# Patient Record
Sex: FEMALE | Race: ASIAN | NOT HISPANIC OR LATINO | URBAN - METROPOLITAN AREA
[De-identification: names, ages, dates, MRNs, and addresses within clinical notes are randomized per-mention and may not be internally consistent; named-entity substitution may affect disease eponyms.]

---

## 2017-09-06 ENCOUNTER — OUTPATIENT (OUTPATIENT)
Dept: OUTPATIENT SERVICES | Facility: HOSPITAL | Age: 62
LOS: 1 days | End: 2017-09-06
Payer: COMMERCIAL

## 2017-09-06 DIAGNOSIS — Z51.89 ENCOUNTER FOR OTHER SPECIFIED AFTERCARE: ICD-10-CM

## 2017-09-11 DIAGNOSIS — S32.028D OTHER FRACTURE OF SECOND LUMBAR VERTEBRA, SUBSEQUENT ENCOUNTER FOR FRACTURE WITH ROUTINE HEALING: ICD-10-CM

## 2017-10-27 PROCEDURE — 97010 HOT OR COLD PACKS THERAPY: CPT

## 2017-10-27 PROCEDURE — 97163 PT EVAL HIGH COMPLEX 45 MIN: CPT

## 2017-10-27 PROCEDURE — 97140 MANUAL THERAPY 1/> REGIONS: CPT

## 2017-10-27 PROCEDURE — 97110 THERAPEUTIC EXERCISES: CPT

## 2017-11-03 PROBLEM — Z00.00 ENCOUNTER FOR PREVENTIVE HEALTH EXAMINATION: Status: ACTIVE | Noted: 2017-11-03

## 2017-11-22 ENCOUNTER — APPOINTMENT (OUTPATIENT)
Dept: ORTHOPEDIC SURGERY | Facility: CLINIC | Age: 62
End: 2017-11-22

## 2020-04-26 ENCOUNTER — MESSAGE (OUTPATIENT)
Age: 65
End: 2020-04-26

## 2022-04-07 ENCOUNTER — LABORATORY RESULT (OUTPATIENT)
Age: 67
End: 2022-04-07

## 2023-02-09 ENCOUNTER — NON-APPOINTMENT (OUTPATIENT)
Age: 68
End: 2023-02-09

## 2023-03-08 ENCOUNTER — NON-APPOINTMENT (OUTPATIENT)
Age: 68
End: 2023-03-08

## 2023-03-08 ENCOUNTER — APPOINTMENT (OUTPATIENT)
Dept: ORTHOPEDIC SURGERY | Facility: CLINIC | Age: 68
End: 2023-03-08
Payer: COMMERCIAL

## 2023-03-08 VITALS
SYSTOLIC BLOOD PRESSURE: 138 MMHG | BODY MASS INDEX: 32.98 KG/M2 | HEART RATE: 76 BPM | WEIGHT: 168 LBS | DIASTOLIC BLOOD PRESSURE: 77 MMHG | HEIGHT: 60 IN

## 2023-03-08 PROCEDURE — 99204 OFFICE O/P NEW MOD 45 MIN: CPT

## 2023-03-08 PROCEDURE — 73562 X-RAY EXAM OF KNEE 3: CPT | Mod: RT

## 2023-03-08 NOTE — HISTORY OF PRESENT ILLNESS
[Pain Location] : pain [Worsening] : worsening [___ mths] : [unfilled] month(s) ago [3] : a current pain level of 3/10 [Walking] : worsened by walking [Knee Flexion] : worsened with knee flexion [Acetaminophen] : relieved by acetaminophen [Rest] : relieved by rest [de-identified] : 66 y/o F pt is here for right knee pain. Patient states the pain has been worsening in the past 8 months 1 day it did swell significantly she could not bear weight for 2 days now the pain is better but she is very limited with her walking if she walks more than 10 minutes she has to sit down she has been in physical therapy which helps her with the motion today her pain intensity is 3 out of 10 it is localized in the knee she feels achy pain she did try HA injection in December 2022 with Dr. Jordan which helped a little bit she had MRI of the right knee done on August 11, 2022 which showed severe medial and moderate patellofemoral lateral compartment osteoarthritis. 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 \par She uses Tylenol for pain

## 2023-03-08 NOTE — DISCUSSION/SUMMARY
[Medication Risks Reviewed] : Medication risks reviewed [Surgical risks reviewed] : Surgical risks reviewed [de-identified] : 66 y/o F pt is here for advanced medial compartmental osteoarthritis of the right knee. The nature of her condition and treatment options were discussed in detail. The pt is a reasonable future candidate for a right TKA. The surgery was discussed in detail. The pt has exhausted conservative treatment such as HA injections, cortisone injections, and antiinflammatories which are decreasingly helpful. She states she is losing her quality of life and is interested in surgery. She will talk with the surgical coordinators to schedule a right TKA. All questions were answered. \par \par The patient is a 67 year individual with end stage arthritis of their right knee joint. Based upon the patient's continued symptoms and failure to respond to conservative treatment (including HA injections, cortisone injections, over the counter medications, and PT)I have recommended a right total knee arthroplasty for this patient. A long discussion took place with the patient describing what a total joint replacement is and what the procedure would entail. A total knee arthroplasty model, similar to the implant that was used during the operation, was utilized to demonstrate and to discuss the various bearing surfaces of the implants. The hospitalization and post-operative care and rehabilitation were also discussed. The use of perioperative antibiotics and DVT prophylaxis were discussed. The risk, benefits and alternatives to a surgical intervention were discussed at length with the patient. The patient was also advised of risks related to the medical comorbidities, elevated body mass index (BMI), and smoking where applicable. We discussed how to reduce modifiable risk factors and encouraged smoking cessation were applicable. A lengthy discussion took place to review the most common complications including but not limited to: deep vein thrombosis, pulmonary embolus, heart attack, stroke, infection, wound breakdown, numbness, damage to nerves, tendon, muscles, arteries or other blood vessels, death and other possible complications from anesthesia. The patient was told that we will take steps to minimize these risks by using sterile technique, antibiotics and DVT prophylaxis when appropriate and follow the patient postoperatively in the office setting to monitor progress. The possibility of recurrent pain, no improvement in pain and actual worsening of pain were also discussed with the patient.\par The discharge plan of care focused on the patient going home following surgery. The patient was encouraged to make the necessary arrangements to have someone stay with them when they are discharged home. Following discharge, a home care nurse was to the patient. The home care nurse would open the patient’s home care case and request home physical therapy services. Home physical therapy was to commence following discharge provided it was appropriate and covered by the health insurance benefit plan. \par The benefits of surgery were discussed with the patient including the potential for improving her current clinical condition through operative intervention. Alternatives to surgical intervention including continued conservative management were also discussed in detail. All questions were answered to the satisfaction of the patient. The treatment plan of care, as well as a model of a total knee arthroplasty equivalent to the one that will be used for their total joint replacement, was shared with the patient. The patient agreed to the plan of care as well as the use of implants in their total joint replacement.

## 2023-03-08 NOTE — PHYSICAL EXAM
[Antalgic] : antalgic [LE] : Sensory: Intact in bilateral lower extremities [ALL] : dorsalis pedis, posterior tibial, femoral, popliteal, and radial 2+ and symmetric bilaterally [Normal] : Alert and in no acute distress [Poor Appearance] : well-appearing [de-identified] : GENERAL APPEARANCE: Well nourished and hydrated, pleasant, alert, and oriented x 3. Appears their stated age. \par HEENT: Normocephalic, extraocular eye motion intact. Nasal septum midline. Oral cavity clear. External auditory canal clear. \par RESPIRATORY: Breath sounds clear and audible in all lobes. No wheezing, No accessory muscle use.\par CARDIOVASCULAR: No apparent abnormalities. No lower leg edema. No varicosities. Pedal pulses are palpable.\par NEUROLOGIC: Sensation is normal, no muscle weakness in the upper or lower extremities.\par DERMATOLOGIC: No apparent skin lesions, moist, warm, no rash.\par SPINE: Cervical spine appears normal and moves freely; thoracic spine appears normal and moves freely; lumbosacral spine appears normal and moves freely, normal, nontender.\par MUSCULOSKELETAL: Hands, wrists, and elbows are normal and move freely, shoulders are normal and move freely.  [de-identified] : Right knee exam shows swelling and tenderness in the right knee range of motion is a 0 to 120 degrees with pain. 	  [de-identified] : 3V xray of the right knee done in the office today and reviewed by Dr. Kevin Mo demonstrates advanced medial compartmental osteoarthritis 	 \par \par MRI of the right knee done at [facility] on Aug 11, 2022 shows:\par 1. Severe medial, moderate patellofemoral and lateral compartmental osteoarthritis \par 2. Complex tear of the posterior horn of the medial meniscus with partial thickness radial and peripheral longitudinal tear. Intrasubstance degeneration and partial extrusion of the body.\par 3. Small partial thickness radial tear of the free edge of the anterior horn of the lateral meniscus. \par 4. Sprain (grade 1 injury) of the MCL\par 5. Partial tear of the ACL.\par 6. Large joint effusion, synovitis and intra-articular bodies.

## 2023-09-06 ENCOUNTER — APPOINTMENT (OUTPATIENT)
Dept: ORTHOPEDIC SURGERY | Facility: CLINIC | Age: 68
End: 2023-09-06
Payer: COMMERCIAL

## 2023-09-06 VITALS
BODY MASS INDEX: 32.98 KG/M2 | HEIGHT: 60 IN | HEART RATE: 76 BPM | WEIGHT: 168 LBS | SYSTOLIC BLOOD PRESSURE: 138 MMHG | DIASTOLIC BLOOD PRESSURE: 73 MMHG

## 2023-09-06 DIAGNOSIS — M17.11 UNILATERAL PRIMARY OSTEOARTHRITIS, RIGHT KNEE: ICD-10-CM

## 2023-09-06 PROCEDURE — 99214 OFFICE O/P EST MOD 30 MIN: CPT

## 2023-09-06 PROCEDURE — 73562 X-RAY EXAM OF KNEE 3: CPT | Mod: LT

## 2023-09-06 NOTE — DISCUSSION/SUMMARY
[Medication Risks Reviewed] : Medication risks reviewed [Surgical risks reviewed] : Surgical risks reviewed [de-identified] : 69 y/o F pt is here for advanced medial compartmental osteoarthritis of the right knee and left knee. The nature of her condition and treatment options were discussed in detail. The pt is a candidate for a bilateral TKA. The surgery was discussed in detail. The pt has exhausted conservative treatment such as HA injections, cortisone injections, and anti-inflammatories which are decreasingly helpful. The pt has been scheduled for a right TKA in October. She states her left knee pain is worsening and would like to schedule surgery for the left knee on the same day or a few weeks after the right knee. We discussed a possible simultaneous bilateral TKA. The pt decided to pursue her left TKA after her scheduled right TKA in October. She will talk with the surgical coordinators to schedule a left TKA in April 2024 after her right knee. All questions were answered.  The patient is a 68 year individual with end stage arthritis of their right knee joint. Based upon the patient's continued symptoms and failure to respond to conservative treatment (including HA injections, cortisone injections, over the counter medications, and PT)I have recommended a right total knee arthroplasty for this patient. A long discussion took place with the patient describing what a total joint replacement is and what the procedure would entail. A total knee arthroplasty model, similar to the implant that was used during the operation, was utilized to demonstrate and to discuss the various bearing surfaces of the implants. The hospitalization and post-operative care and rehabilitation were also discussed. The use of perioperative antibiotics and DVT prophylaxis were discussed. The risk, benefits and alternatives to a surgical intervention were discussed at length with the patient. The patient was also advised of risks related to the medical comorbidities, elevated body mass index (BMI), and smoking where applicable. We discussed how to reduce modifiable risk factors and encouraged smoking cessation were applicable. A lengthy discussion took place to review the most common complications including but not limited to: deep vein thrombosis, pulmonary embolus, heart attack, stroke, infection, wound breakdown, numbness, damage to nerves, tendon, muscles, arteries or other blood vessels, death and other possible complications from anesthesia. The patient was told that we will take steps to minimize these risks by using sterile technique, antibiotics and DVT prophylaxis when appropriate and follow the patient postoperatively in the office setting to monitor progress. The possibility of recurrent pain, no improvement in pain and actual worsening of pain were also discussed with the patient. The discharge plan of care focused on the patient going home following surgery. The patient was encouraged to make the necessary arrangements to have someone stay with them when they are discharged home. Following discharge, a home care nurse was to the patient. The home care nurse would open the patients home care case and request home physical therapy services. Home physical therapy was to commence following discharge provided it was appropriate and covered by the health insurance benefit plan.  The benefits of surgery were discussed with the patient including the potential for improving her current clinical condition through operative intervention. Alternatives to surgical intervention including continued conservative management were also discussed in detail. All questions were answered to the satisfaction of the patient. The treatment plan of care, as well as a model of a total knee arthroplasty equivalent to the one that will be used for their total joint replacement, was shared with the patient. The patient agreed to the plan of care as well as the use of implants in their total joint replacement.   "The patient is a 68 year individual with end stage arthritis of their left knee joint. Based upon the patient's continued symptoms and failure to respond to conservative treatment (including HA injections, cortisone injections, over the counter medications, and PT) I have recommended a left total knee arthroplasty for this patient. A long discussion took place with the patient describing what a total joint replacement is and what the procedure would entail. A total knee arthroplasty model, similar to the implant that was used during the operation, was utilized to demonstrate and to discuss the various bearing surfaces of the implants. The hospitalization and post-operative care and rehabilitation were also discussed. The use of perioperative antibiotics and DVT prophylaxis were discussed. The risk, benefits and alternatives to a surgical intervention were discussed at length with the patient. The patient was also advised of risks related to the medical comorbidities, elevated body mass index (BMI), and smoking where applicable. We discussed how to reduce modifiable risk factors and encouraged smoking cessation were applicable. A lengthy discussion took place to review the most common complications including but not limited to: deep vein thrombosis, pulmonary embolus, heart attack, stroke, infection, wound breakdown, numbness, damage to nerves, tendon, muscles, arteries or other blood vessels, death and other possible complications from anesthesia. The patient was told that we will take steps to minimize these risks by using sterile technique, antibiotics and DVT prophylaxis when appropriate and follow the patient postoperatively in the office setting to monitor progress. The possibility of recurrent pain, no improvement in pain and actual worsening of pain were also discussed with the patient. The discharge plan of care focused on the patient going home following surgery. The patient was encouraged to make the necessary arrangements to have someone stay with them when they are discharged home. Following discharge, a home care nurse was to the patient. The home care nurse would open the patients home care case and request home physical therapy services. Home physical therapy was to commence following discharge provided it was appropriate and covered by the health insurance benefit plan.  The benefits of surgery were discussed with the patient including the potential for improving her current clinical condition through operative intervention. Alternatives to surgical intervention including continued conservative management were also discussed in detail. All questions were answered to the satisfaction of the patient. The treatment plan of care, as well as a model of a total knee arthroplasty equivalent to the one that will be used for their total joint replacement, was shared with the patient. The patient agreed to the plan of care as well as the use of implants in their total joint replacement."

## 2023-09-06 NOTE — HISTORY OF PRESENT ILLNESS
[Pain Location] : pain [Stable] : stable [4] : a current pain level of 4/10 [de-identified] : Patient presents to office complaining of chronic left knee pain.  She had arthroscopy in the past she had Synvisc injection twice she also had cortisone injection in the left knee.  patient was seen for the right knee on March 8, 2023 she is scheduled to have right knee replacement in October.  Patient is interested in surgical treatment in the left knee as well. She is limited with distance walking and riding bicycle outside which she would like to achieve after the knee replacement.  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  She uses Tylenol for pain

## 2023-09-06 NOTE — PHYSICAL EXAM
[Antalgic] : antalgic [de-identified] : GENERAL APPEARANCE: Well nourished and hydrated, pleasant, alert, and oriented x 3. Appears their stated age. \par  HEENT: Normocephalic, extraocular eye motion intact. Nasal septum midline. Oral cavity clear. External auditory canal clear. \par  RESPIRATORY: Breath sounds clear and audible in all lobes. No wheezing, No accessory muscle use.\par  CARDIOVASCULAR: No apparent abnormalities. No lower leg edema. No varicosities. Pedal pulses are palpable.\par  NEUROLOGIC: Sensation is normal, no muscle weakness in the upper or lower extremities.\par  DERMATOLOGIC: No apparent skin lesions, moist, warm, no rash.\par  SPINE: Cervical spine appears normal and moves freely; thoracic spine appears normal and moves freely; lumbosacral spine appears normal and moves freely, normal, nontender.\par  MUSCULOSKELETAL: Hands, wrists, and elbows are normal and move freely, shoulders are normal and move freely.  [de-identified] : b/l knee exam shows	swelling and tenderness in the right knee range of motion is a 0 to 120 degrees with pain. 	  [de-identified] : 3V xray of the left knee done in the office today and reviewed by Dr. Kevin Mo demonstrates advanced medial compartmental osteoarthritis.

## 2023-09-25 ENCOUNTER — APPOINTMENT (OUTPATIENT)
Dept: ORTHOPEDIC SURGERY | Facility: CLINIC | Age: 68
End: 2023-09-25

## 2023-09-27 ENCOUNTER — OUTPATIENT (OUTPATIENT)
Dept: OUTPATIENT SERVICES | Facility: HOSPITAL | Age: 68
LOS: 1 days | End: 2023-09-27
Payer: COMMERCIAL

## 2023-09-27 VITALS
HEART RATE: 77 BPM | DIASTOLIC BLOOD PRESSURE: 60 MMHG | WEIGHT: 166.23 LBS | SYSTOLIC BLOOD PRESSURE: 122 MMHG | OXYGEN SATURATION: 98 % | TEMPERATURE: 99 F | HEIGHT: 60 IN | RESPIRATION RATE: 18 BRPM

## 2023-09-27 DIAGNOSIS — Z98.890 OTHER SPECIFIED POSTPROCEDURAL STATES: Chronic | ICD-10-CM

## 2023-09-27 DIAGNOSIS — Z29.9 ENCOUNTER FOR PROPHYLACTIC MEASURES, UNSPECIFIED: ICD-10-CM

## 2023-09-27 DIAGNOSIS — Z01.818 ENCOUNTER FOR OTHER PREPROCEDURAL EXAMINATION: ICD-10-CM

## 2023-09-27 DIAGNOSIS — Z98.49 CATARACT EXTRACTION STATUS, UNSPECIFIED EYE: Chronic | ICD-10-CM

## 2023-09-27 DIAGNOSIS — M25.561 PAIN IN RIGHT KNEE: ICD-10-CM

## 2023-09-27 DIAGNOSIS — I10 ESSENTIAL (PRIMARY) HYPERTENSION: ICD-10-CM

## 2023-09-27 DIAGNOSIS — Z90.49 ACQUIRED ABSENCE OF OTHER SPECIFIED PARTS OF DIGESTIVE TRACT: Chronic | ICD-10-CM

## 2023-09-27 LAB
A1C WITH ESTIMATED AVERAGE GLUCOSE RESULT: 5.5 % — SIGNIFICANT CHANGE UP (ref 4–5.6)
ALBUMIN SERPL ELPH-MCNC: 4.1 G/DL — SIGNIFICANT CHANGE UP (ref 3.3–5.2)
ALP SERPL-CCNC: 121 U/L — HIGH (ref 40–120)
ALT FLD-CCNC: 15 U/L — SIGNIFICANT CHANGE UP
ANION GAP SERPL CALC-SCNC: 14 MMOL/L — SIGNIFICANT CHANGE UP (ref 5–17)
APTT BLD: 37.9 SEC — HIGH (ref 24.5–35.6)
AST SERPL-CCNC: 22 U/L — SIGNIFICANT CHANGE UP
BASOPHILS # BLD AUTO: 0.04 K/UL — SIGNIFICANT CHANGE UP (ref 0–0.2)
BASOPHILS NFR BLD AUTO: 0.5 % — SIGNIFICANT CHANGE UP (ref 0–2)
BILIRUB SERPL-MCNC: <0.2 MG/DL — LOW (ref 0.4–2)
BLD GP AB SCN SERPL QL: SIGNIFICANT CHANGE UP
BUN SERPL-MCNC: 17.8 MG/DL — SIGNIFICANT CHANGE UP (ref 8–20)
CALCIUM SERPL-MCNC: 9.3 MG/DL — SIGNIFICANT CHANGE UP (ref 8.4–10.5)
CHLORIDE SERPL-SCNC: 101 MMOL/L — SIGNIFICANT CHANGE UP (ref 96–108)
CO2 SERPL-SCNC: 24 MMOL/L — SIGNIFICANT CHANGE UP (ref 22–29)
CREAT SERPL-MCNC: 0.75 MG/DL — SIGNIFICANT CHANGE UP (ref 0.5–1.3)
EGFR: 86 ML/MIN/1.73M2 — SIGNIFICANT CHANGE UP
EOSINOPHIL # BLD AUTO: 0.29 K/UL — SIGNIFICANT CHANGE UP (ref 0–0.5)
EOSINOPHIL NFR BLD AUTO: 3.9 % — SIGNIFICANT CHANGE UP (ref 0–6)
ESTIMATED AVERAGE GLUCOSE: 111 MG/DL — SIGNIFICANT CHANGE UP (ref 68–114)
GLUCOSE SERPL-MCNC: 85 MG/DL — SIGNIFICANT CHANGE UP (ref 70–99)
HCT VFR BLD CALC: 35.3 % — SIGNIFICANT CHANGE UP (ref 34.5–45)
HGB BLD-MCNC: 11.6 G/DL — SIGNIFICANT CHANGE UP (ref 11.5–15.5)
IMM GRANULOCYTES NFR BLD AUTO: 0.4 % — SIGNIFICANT CHANGE UP (ref 0–0.9)
INR BLD: 1.04 RATIO — SIGNIFICANT CHANGE UP (ref 0.85–1.18)
LYMPHOCYTES # BLD AUTO: 1.94 K/UL — SIGNIFICANT CHANGE UP (ref 1–3.3)
LYMPHOCYTES # BLD AUTO: 26.1 % — SIGNIFICANT CHANGE UP (ref 13–44)
MCHC RBC-ENTMCNC: 29.9 PG — SIGNIFICANT CHANGE UP (ref 27–34)
MCHC RBC-ENTMCNC: 32.9 GM/DL — SIGNIFICANT CHANGE UP (ref 32–36)
MCV RBC AUTO: 91 FL — SIGNIFICANT CHANGE UP (ref 80–100)
MONOCYTES # BLD AUTO: 0.65 K/UL — SIGNIFICANT CHANGE UP (ref 0–0.9)
MONOCYTES NFR BLD AUTO: 8.7 % — SIGNIFICANT CHANGE UP (ref 2–14)
NEUTROPHILS # BLD AUTO: 4.49 K/UL — SIGNIFICANT CHANGE UP (ref 1.8–7.4)
NEUTROPHILS NFR BLD AUTO: 60.4 % — SIGNIFICANT CHANGE UP (ref 43–77)
PLATELET # BLD AUTO: 293 K/UL — SIGNIFICANT CHANGE UP (ref 150–400)
POTASSIUM SERPL-MCNC: 4.7 MMOL/L — SIGNIFICANT CHANGE UP (ref 3.5–5.3)
POTASSIUM SERPL-SCNC: 4.7 MMOL/L — SIGNIFICANT CHANGE UP (ref 3.5–5.3)
PROT SERPL-MCNC: 7.1 G/DL — SIGNIFICANT CHANGE UP (ref 6.6–8.7)
PROTHROM AB SERPL-ACNC: 11.5 SEC — SIGNIFICANT CHANGE UP (ref 9.5–13)
RBC # BLD: 3.88 M/UL — SIGNIFICANT CHANGE UP (ref 3.8–5.2)
RBC # FLD: 12.7 % — SIGNIFICANT CHANGE UP (ref 10.3–14.5)
SODIUM SERPL-SCNC: 138 MMOL/L — SIGNIFICANT CHANGE UP (ref 135–145)
WBC # BLD: 7.44 K/UL — SIGNIFICANT CHANGE UP (ref 3.8–10.5)
WBC # FLD AUTO: 7.44 K/UL — SIGNIFICANT CHANGE UP (ref 3.8–10.5)

## 2023-09-27 PROCEDURE — G0463: CPT

## 2023-09-27 RX ORDER — EZETIMIBE 10 MG/1
1 TABLET ORAL
Refills: 0 | DISCHARGE

## 2023-09-27 NOTE — H&P PST ADULT - NSICDXPASTSURGICALHX_GEN_ALL_CORE_FT
PAST SURGICAL HISTORY:  H/O cataract extraction     History of appendectomy     S/P left knee arthroscopy

## 2023-09-27 NOTE — H&P PST ADULT - ASSESSMENT
69 y/o female with PMH of HTN, HDL, asthma, OA, GERD arrives from home to PST with complaints of right knee pain. Upon assessment, + right knee medical tenderness to palpation, surgical site clear and intact. Pt instructed to stop vitamins/supplements/herbal medications/ASA/NSAIDS for one week prior to surgery and discuss with PMD. Patient educated on surgical scrub, preadmission instructions, medical clearance and day of procedure medications, verbalizes understanding.    OPIOID RISK TOOL    HAZEL EACH BOX THAT APPLIES AND ADD TOTALS AT THE END    FAMILY HISTORY OF SUBSTANCE ABUSE                 FEMALE         MALE                                                Alcohol                             [  ]1 pt          [  ]3pts                                               Illegal Durgs                     [  ]2 pts        [  ]3pts                                               Rx Drugs                           [  ]4 pts        [  ]4 pts    PERSONAL HISTORY OF SUBSTANCE ABUSE                                                                                          Alcohol                             [  ]3 pts       [  ]3 pts                                               Illegal Durgs                     [  ]4 pts        [  ]4 pts                                               Rx Drugs                           [  ]5 pts        [  ]5 pts    AGE BETWEEN 16-45 YEARS                                      [  ]1 pt         [  ]1 pt    HISTORY OF PREADOLESCENT   SEXUAL ABUSE                                                             [  ]3 pts        [  ]0pts    PSYCHOLOGICAL DISEASE                     ADD, OCD, Bipolar, Schizophrenia        [  ]2 pts         [  ]2 pts                      Depression                                               [  ]1 pt           [  ]1 pt           SCORING TOTAL   (add numbers and type here)              (**0*)                                     A score of 3 or lower indicated LOW risk for future opiod abuse  A score of 4 to 7 indicated moderate risk for future opiod abuse  A score of 8 or higher indicates a high risk for opiod abuse    CAPRINI SCORE    AGE RELATED RISK FACTORS                                                             [ ] Age 41-60 years                                            (1 Point)  [x ] Age: 61-74 years                                           (2 Points)                 [ ] Age= 75 years                                                (3 Points)             DISEASE RELATED RISK FACTORS                                                       [ ] Edema in the lower extremities                 (1 Point)                     x[ ] Varicose veins                                               (1 Point)                                 [ x] BMI > 25 Kg/m2                                            (1 Point)                                  [ ] Serious infection (ie PNA)                            (1 Point)                     [ ] Lung disease ( COPD, Emphysema)            (1 Point)                                                                          [ ] Acute myocardial infarction                         (1 Point)                  [ ] Congestive heart failure (in the previous month)  (1 Point)         [ ] Inflammatory bowel disease                            (1 Point)                  [ ] Central venous access, PICC or Port               (2 points)       (within the last month)                                                                [ ] Stroke (in the previous month)                        (5 Points)    [ ] Previous or present malignancy                       (2 points)                                                                                                                                                         HEMATOLOGY RELATED FACTORS                                                         [ ] Prior episodes of VTE                                     (3 Points)                     [ ] Positive family history for VTE                      (3 Points)                  [ ] Prothrombin  A                                     (3 Points)                     [ ] Factor V Leiden                                                (3 Points)                        [ ] Lupus anticoagulants                                      (3 Points)                                                           [ ] Anticardiolipin antibodies                              (3 Points)                                                       [ ] High homocysteine in the blood                   (3 Points)                                             [ ] Other congenital or acquired thrombophilia      (3 Points)                                                [ ] Heparin induced thrombocytopenia                  (3 Points)                                        MOBILITY RELATED FACTORS  [ ] Bed rest                                                         (1 Point)  [ ] Plaster cast                                                    (2 points)  [ ] Bed bound for more than 72 hours           (2 Points)    GENDER SPECIFIC FACTORS  [ ] Pregnancy or had a baby within the last month   (1 Point)  [ ] Post-partum < 6 weeks                                   (1 Point)  [ ] Hormonal therapy  or oral contraception   (1 Point)  [ ] History of pregnancy complications              (1 point)  [ ] Unexplained or recurrent              (1 Point)    OTHER RISK FACTORS                                           (1 Point)  [ ] BMI >40, smoking, diabetes requiring insulin, chemotherapy  blood transfusions and length of surgery over 2 hours    SURGERY RELATED RISK FACTORS  [ ]  Section within the last month     (1 Point)  [ ] Minor surgery                                                  (1 Point)  [ ] Arthroscopic surgery                                       (2 Points)  [x ] Planned major surgery lasting more            (2 Points)      than 45 minutes     [x ] Elective hip or knee joint replacement       (5 points)       surgery                                                TRAUMA RELATED RISK FACTORS  [ ] Fracture of the hip, pelvis, or leg                       (5 Points)  [ ] Spinal cord injury resulting in paralysis             (5 points)       (in the previous month)    [ ] Paralysis  (less than 1 month)                             (5 Points)  [ ] Multiple Trauma within 1 month                        (5 Points)    Total Score [    11 ]    Caprini Score 0-2: Low Risk, NO VTE prophylaxis required for most patients, encourage ambulation  Caprini Score 3-6: Moderate Risk , pharmacologic VTE prophylaxis is indicated for most patients (in the absence of contraindications)  Caprini Score Greater than or =7: High risk, pharmocologic VTE prophylaxis indicated for most patients (in the absence of contraindications)

## 2023-09-27 NOTE — H&P PST ADULT - NSANTHOSAYNRD_GEN_A_CORE
No. JAVID screening performed.  STOP BANG Legend: 0-2 = LOW Risk; 3-4 = INTERMEDIATE Risk; 5-8 = HIGH Risk

## 2023-09-27 NOTE — H&P PST ADULT - HISTORY OF PRESENT ILLNESS
Patient presents to office complaining of chronic left knee pain. She had arthroscopy in the past she had Synvisc injection twice she also had cortisone injection in the left knee.   patient was seen for the right knee on March 8, 2023 she is scheduled to have right knee replacement in October. Patient is interested in surgical treatment in the left knee as well. She is limited with distance walking and riding bicycle outside which she would like to achieve after the knee replacement.   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    She uses Tylenol for pain          VAL ANDREASDENEENBERNICE presents with pain. She states the symptoms are stable. Pain levels include a current pain level of 4/10.    Patient presents to office complaining of chronic left knee pain. She had arthroscopy in the past she had Synvisc injection twice she also had cortisone injection in the left knee.   patient was seen for the right knee on March 8, 2023 she is scheduled to have right knee replacement in October. Patient is interested in surgical treatment in the left knee as well. She is limited with distance walking and riding bicycle outside which she would like to achieve after the knee replacement.   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    She uses Tylenol for pain          VAL BOWERBERNICE presents with pain. She states the symptoms are stable. Pain levels include a current pain level of 4/10.       last july steroid shots- helped to carry on   20 minute walk can not due to pain  no pain at rest can get to a 8/10 with walking  no numb no ting no weake    67 y/o female with PMH of HTN, HDL, asthma, OA, GERD arrives from home to PST with complaints of right knee pain. States the pain has worsened since July. Pain at rest is a 2/10, but with walking can get up to a 8/10. Her pain is achy and relieved by rest. She denies any numbness, weakness, or tingling in the leg. She uses Tylenol for pain with some relief. She is scheduled for Right Knee Total Knee Joint Replacement with MD Mo on 10/17/23.  Medical & Cardiac Clearance Pending

## 2023-09-27 NOTE — H&P PST ADULT - PROBLEM SELECTOR PLAN 1
She is scheduled for Right Knee Total Knee Joint Replacement with MD Mo on 10/17/23.  Medical & Cardiac Clearance Pending

## 2023-09-27 NOTE — H&P PST ADULT - PROBLEM SELECTOR PLAN 3
Caprini Score 11: High risk, pharmocologic VTE prophylaxis indicated for most patients (in the absence of contraindications)

## 2023-09-27 NOTE — H&P PST ADULT - NSICDXFAMILYHX_GEN_ALL_CORE_FT
FAMILY HISTORY:  Father  Still living? Unknown  Family history of early CAD, Age at diagnosis: Age Unknown    Mother  Still living? Unknown  FH: breast cancer, Age at diagnosis: Age Unknown    Sibling  Still living? Unknown  FH: type 2 diabetes, Age at diagnosis: Age Unknown

## 2023-09-27 NOTE — H&P PST ADULT - NSICDXPASTMEDICALHX_GEN_ALL_CORE_FT
PAST MEDICAL HISTORY:  Asthma     Asthma with allergic rhinitis     Compression of thoracic vertebra     GERD (gastroesophageal reflux disease)     H/O dermatomyositis     High cholesterol     HTN (hypertension)     OA (osteoarthritis)

## 2023-09-28 LAB
MRSA PCR RESULT.: SIGNIFICANT CHANGE UP
S AUREUS DNA NOSE QL NAA+PROBE: SIGNIFICANT CHANGE UP

## 2023-10-16 ENCOUNTER — TRANSCRIPTION ENCOUNTER (OUTPATIENT)
Age: 68
End: 2023-10-16

## 2023-10-17 ENCOUNTER — TRANSCRIPTION ENCOUNTER (OUTPATIENT)
Age: 68
End: 2023-10-17

## 2023-10-17 ENCOUNTER — APPOINTMENT (OUTPATIENT)
Dept: ORTHOPEDIC SURGERY | Facility: HOSPITAL | Age: 68
End: 2023-10-17

## 2023-10-17 ENCOUNTER — INPATIENT (INPATIENT)
Facility: HOSPITAL | Age: 68
LOS: 0 days | Discharge: HOME HEALTH | DRG: 470 | End: 2023-10-18
Attending: ORTHOPAEDIC SURGERY | Admitting: ORTHOPAEDIC SURGERY
Payer: COMMERCIAL

## 2023-10-17 VITALS
TEMPERATURE: 98 F | RESPIRATION RATE: 15 BRPM | SYSTOLIC BLOOD PRESSURE: 156 MMHG | HEIGHT: 60 IN | OXYGEN SATURATION: 100 % | WEIGHT: 166.23 LBS | DIASTOLIC BLOOD PRESSURE: 90 MMHG

## 2023-10-17 DIAGNOSIS — Z98.49 CATARACT EXTRACTION STATUS, UNSPECIFIED EYE: Chronic | ICD-10-CM

## 2023-10-17 DIAGNOSIS — M17.11 UNILATERAL PRIMARY OSTEOARTHRITIS, RIGHT KNEE: ICD-10-CM

## 2023-10-17 DIAGNOSIS — Z90.49 ACQUIRED ABSENCE OF OTHER SPECIFIED PARTS OF DIGESTIVE TRACT: Chronic | ICD-10-CM

## 2023-10-17 DIAGNOSIS — Z98.890 OTHER SPECIFIED POSTPROCEDURAL STATES: Chronic | ICD-10-CM

## 2023-10-17 PROBLEM — J45.909 UNSPECIFIED ASTHMA, UNCOMPLICATED: Chronic | Status: ACTIVE | Noted: 2023-09-27

## 2023-10-17 PROBLEM — M19.90 UNSPECIFIED OSTEOARTHRITIS, UNSPECIFIED SITE: Chronic | Status: ACTIVE | Noted: 2023-09-27

## 2023-10-17 PROBLEM — E78.00 PURE HYPERCHOLESTEROLEMIA, UNSPECIFIED: Chronic | Status: ACTIVE | Noted: 2023-09-27

## 2023-10-17 PROBLEM — S22.000A WEDGE COMPRESSION FRACTURE OF UNSPECIFIED THORACIC VERTEBRA, INITIAL ENCOUNTER FOR CLOSED FRACTURE: Chronic | Status: ACTIVE | Noted: 2023-09-27

## 2023-10-17 PROBLEM — K21.9 GASTRO-ESOPHAGEAL REFLUX DISEASE WITHOUT ESOPHAGITIS: Chronic | Status: ACTIVE | Noted: 2023-09-27

## 2023-10-17 PROBLEM — I10 ESSENTIAL (PRIMARY) HYPERTENSION: Chronic | Status: ACTIVE | Noted: 2023-09-27

## 2023-10-17 PROBLEM — Z87.39 PERSONAL HISTORY OF OTHER DISEASES OF THE MUSCULOSKELETAL SYSTEM AND CONNECTIVE TISSUE: Chronic | Status: ACTIVE | Noted: 2023-09-27

## 2023-10-17 LAB
BLD GP AB SCN SERPL QL: SIGNIFICANT CHANGE UP
BLD GP AB SCN SERPL QL: SIGNIFICANT CHANGE UP

## 2023-10-17 PROCEDURE — 99222 1ST HOSP IP/OBS MODERATE 55: CPT

## 2023-10-17 PROCEDURE — 27447 TOTAL KNEE ARTHROPLASTY: CPT | Mod: AS,RT

## 2023-10-17 PROCEDURE — 73560 X-RAY EXAM OF KNEE 1 OR 2: CPT | Mod: 26,RT

## 2023-10-17 PROCEDURE — 20985 CPTR-ASST DIR MS PX: CPT

## 2023-10-17 PROCEDURE — 27447 TOTAL KNEE ARTHROPLASTY: CPT | Mod: RT

## 2023-10-17 DEVICE — STEM MOD UNIV TIB 12X40MM: Type: IMPLANTABLE DEVICE | Site: RIGHT | Status: FUNCTIONAL

## 2023-10-17 DEVICE — CEMENT PALACOS R: Type: IMPLANTABLE DEVICE | Site: RIGHT | Status: FUNCTIONAL

## 2023-10-17 DEVICE — GUIDE PIN SCREW ORTHO STR FLUTED: Type: IMPLANTABLE DEVICE | Site: RIGHT | Status: FUNCTIONAL

## 2023-10-17 DEVICE — INSERT TIB NONPOROUS UNIV SZ 6 RT: Type: IMPLANTABLE DEVICE | Site: RIGHT | Status: FUNCTIONAL

## 2023-10-17 DEVICE — COMP FEM NON POROUS SZ 6 RT: Type: IMPLANTABLE DEVICE | Site: RIGHT | Status: FUNCTIONAL

## 2023-10-17 DEVICE — GUIDE PIN SCREW ORTHO THRD SQ HEADED: Type: IMPLANTABLE DEVICE | Site: RIGHT | Status: FUNCTIONAL

## 2023-10-17 DEVICE — COMP PATELLA TRI-PEG E-PLUS POLY 8X32MM: Type: IMPLANTABLE DEVICE | Site: RIGHT | Status: FUNCTIONAL

## 2023-10-17 DEVICE — IMPLANTABLE DEVICE: Type: IMPLANTABLE DEVICE | Site: RIGHT | Status: FUNCTIONAL

## 2023-10-17 DEVICE — GUIDE PIN/SCREW ORTHO 3.2 X 37MM: Type: IMPLANTABLE DEVICE | Site: RIGHT | Status: FUNCTIONAL

## 2023-10-17 RX ORDER — ACETAMINOPHEN 500 MG
975 TABLET ORAL ONCE
Refills: 0 | Status: COMPLETED | OUTPATIENT
Start: 2023-10-17 | End: 2023-10-17

## 2023-10-17 RX ORDER — METOCLOPRAMIDE HCL 10 MG
10 TABLET ORAL ONCE
Refills: 0 | Status: COMPLETED | OUTPATIENT
Start: 2023-10-17 | End: 2023-10-17

## 2023-10-17 RX ORDER — ALBUTEROL 90 UG/1
2 AEROSOL, METERED ORAL EVERY 6 HOURS
Refills: 0 | Status: DISCONTINUED | OUTPATIENT
Start: 2023-10-17 | End: 2023-10-18

## 2023-10-17 RX ORDER — MONTELUKAST 4 MG/1
10 TABLET, CHEWABLE ORAL DAILY
Refills: 0 | Status: DISCONTINUED | OUTPATIENT
Start: 2023-10-18 | End: 2023-10-18

## 2023-10-17 RX ORDER — CEFAZOLIN SODIUM 1 G
2000 VIAL (EA) INJECTION
Refills: 0 | Status: COMPLETED | OUTPATIENT
Start: 2023-10-17 | End: 2023-10-17

## 2023-10-17 RX ORDER — SENNA PLUS 8.6 MG/1
2 TABLET ORAL AT BEDTIME
Refills: 0 | Status: DISCONTINUED | OUTPATIENT
Start: 2023-10-17 | End: 2023-10-18

## 2023-10-17 RX ORDER — ACETAMINOPHEN 500 MG
975 TABLET ORAL EVERY 8 HOURS
Refills: 0 | Status: DISCONTINUED | OUTPATIENT
Start: 2023-10-18 | End: 2023-10-18

## 2023-10-17 RX ORDER — MAGNESIUM HYDROXIDE 400 MG/1
30 TABLET, CHEWABLE ORAL DAILY
Refills: 0 | Status: DISCONTINUED | OUTPATIENT
Start: 2023-10-17 | End: 2023-10-18

## 2023-10-17 RX ORDER — POLYETHYLENE GLYCOL 3350 17 G/17G
17 POWDER, FOR SOLUTION ORAL AT BEDTIME
Refills: 0 | Status: DISCONTINUED | OUTPATIENT
Start: 2023-10-17 | End: 2023-10-18

## 2023-10-17 RX ORDER — ACETAMINOPHEN 500 MG
1000 TABLET ORAL ONCE
Refills: 0 | Status: COMPLETED | OUTPATIENT
Start: 2023-10-17 | End: 2023-10-18

## 2023-10-17 RX ORDER — ONDANSETRON 8 MG/1
4 TABLET, FILM COATED ORAL EVERY 6 HOURS
Refills: 0 | Status: DISCONTINUED | OUTPATIENT
Start: 2023-10-17 | End: 2023-10-18

## 2023-10-17 RX ORDER — TRANEXAMIC ACID 100 MG/ML
1000 INJECTION, SOLUTION INTRAVENOUS ONCE
Refills: 0 | Status: DISCONTINUED | OUTPATIENT
Start: 2023-10-17 | End: 2023-10-17

## 2023-10-17 RX ORDER — OXYCODONE HYDROCHLORIDE 5 MG/1
5 TABLET ORAL
Refills: 0 | Status: DISCONTINUED | OUTPATIENT
Start: 2023-10-17 | End: 2023-10-18

## 2023-10-17 RX ORDER — ASPIRIN/CALCIUM CARB/MAGNESIUM 324 MG
81 TABLET ORAL
Refills: 0 | Status: DISCONTINUED | OUTPATIENT
Start: 2023-10-17 | End: 2023-10-18

## 2023-10-17 RX ORDER — HYDROMORPHONE HYDROCHLORIDE 2 MG/ML
0.5 INJECTION INTRAMUSCULAR; INTRAVENOUS; SUBCUTANEOUS
Refills: 0 | Status: DISCONTINUED | OUTPATIENT
Start: 2023-10-17 | End: 2023-10-17

## 2023-10-17 RX ORDER — PANTOPRAZOLE SODIUM 20 MG/1
40 TABLET, DELAYED RELEASE ORAL
Refills: 0 | Status: DISCONTINUED | OUTPATIENT
Start: 2023-10-17 | End: 2023-10-18

## 2023-10-17 RX ORDER — HYDROMORPHONE HYDROCHLORIDE 2 MG/ML
4 INJECTION INTRAMUSCULAR; INTRAVENOUS; SUBCUTANEOUS
Refills: 0 | Status: DISCONTINUED | OUTPATIENT
Start: 2023-10-17 | End: 2023-10-18

## 2023-10-17 RX ORDER — PANTOPRAZOLE SODIUM 20 MG/1
40 TABLET, DELAYED RELEASE ORAL
Refills: 0 | Status: DISCONTINUED | OUTPATIENT
Start: 2023-10-17 | End: 2023-10-17

## 2023-10-17 RX ORDER — SODIUM CHLORIDE 9 MG/ML
1000 INJECTION INTRAMUSCULAR; INTRAVENOUS; SUBCUTANEOUS
Refills: 0 | Status: DISCONTINUED | OUTPATIENT
Start: 2023-10-17 | End: 2023-10-18

## 2023-10-17 RX ORDER — INFLUENZA VIRUS VACCINE 15; 15; 15; 15 UG/.5ML; UG/.5ML; UG/.5ML; UG/.5ML
0.7 SUSPENSION INTRAMUSCULAR ONCE
Refills: 0 | Status: DISCONTINUED | OUTPATIENT
Start: 2023-10-17 | End: 2023-10-18

## 2023-10-17 RX ORDER — CELECOXIB 200 MG/1
400 CAPSULE ORAL ONCE
Refills: 0 | Status: COMPLETED | OUTPATIENT
Start: 2023-10-17 | End: 2023-10-17

## 2023-10-17 RX ORDER — SODIUM CHLORIDE 9 MG/ML
3 INJECTION INTRAMUSCULAR; INTRAVENOUS; SUBCUTANEOUS EVERY 8 HOURS
Refills: 0 | Status: DISCONTINUED | OUTPATIENT
Start: 2023-10-17 | End: 2023-10-17

## 2023-10-17 RX ORDER — CEFAZOLIN SODIUM 1 G
2000 VIAL (EA) INJECTION ONCE
Refills: 0 | Status: DISCONTINUED | OUTPATIENT
Start: 2023-10-17 | End: 2023-10-17

## 2023-10-17 RX ORDER — FENTANYL CITRATE 50 UG/ML
25 INJECTION INTRAVENOUS
Refills: 0 | Status: DISCONTINUED | OUTPATIENT
Start: 2023-10-17 | End: 2023-10-17

## 2023-10-17 RX ORDER — LOSARTAN POTASSIUM 100 MG/1
50 TABLET, FILM COATED ORAL DAILY
Refills: 0 | Status: DISCONTINUED | OUTPATIENT
Start: 2023-10-19 | End: 2023-10-18

## 2023-10-17 RX ORDER — APREPITANT 80 MG/1
40 CAPSULE ORAL ONCE
Refills: 0 | Status: COMPLETED | OUTPATIENT
Start: 2023-10-17 | End: 2023-10-17

## 2023-10-17 RX ORDER — CEFAZOLIN SODIUM 1 G
2000 VIAL (EA) INJECTION
Refills: 0 | Status: DISCONTINUED | OUTPATIENT
Start: 2023-10-17 | End: 2023-10-17

## 2023-10-17 RX ORDER — SODIUM CHLORIDE 9 MG/ML
1000 INJECTION, SOLUTION INTRAVENOUS
Refills: 0 | Status: DISCONTINUED | OUTPATIENT
Start: 2023-10-17 | End: 2023-10-17

## 2023-10-17 RX ORDER — OXYCODONE HYDROCHLORIDE 5 MG/1
10 TABLET ORAL
Refills: 0 | Status: DISCONTINUED | OUTPATIENT
Start: 2023-10-17 | End: 2023-10-18

## 2023-10-17 RX ADMIN — APREPITANT 40 MILLIGRAM(S): 80 CAPSULE ORAL at 06:17

## 2023-10-17 RX ADMIN — Medication 975 MILLIGRAM(S): at 06:17

## 2023-10-17 RX ADMIN — HYDROMORPHONE HYDROCHLORIDE 0.5 MILLIGRAM(S): 2 INJECTION INTRAMUSCULAR; INTRAVENOUS; SUBCUTANEOUS at 11:56

## 2023-10-17 RX ADMIN — Medication 10 MILLIGRAM(S): at 17:05

## 2023-10-17 RX ADMIN — SODIUM CHLORIDE 80 MILLILITER(S): 9 INJECTION INTRAMUSCULAR; INTRAVENOUS; SUBCUTANEOUS at 11:58

## 2023-10-17 RX ADMIN — SODIUM CHLORIDE 75 MILLILITER(S): 9 INJECTION, SOLUTION INTRAVENOUS at 11:04

## 2023-10-17 RX ADMIN — FENTANYL CITRATE 25 MICROGRAM(S): 50 INJECTION INTRAVENOUS at 11:04

## 2023-10-17 RX ADMIN — CELECOXIB 400 MILLIGRAM(S): 200 CAPSULE ORAL at 06:17

## 2023-10-17 RX ADMIN — FENTANYL CITRATE 25 MICROGRAM(S): 50 INJECTION INTRAVENOUS at 10:53

## 2023-10-17 RX ADMIN — FENTANYL CITRATE 25 MICROGRAM(S): 50 INJECTION INTRAVENOUS at 11:00

## 2023-10-17 RX ADMIN — ONDANSETRON 4 MILLIGRAM(S): 8 TABLET, FILM COATED ORAL at 11:28

## 2023-10-17 RX ADMIN — OXYCODONE HYDROCHLORIDE 10 MILLIGRAM(S): 5 TABLET ORAL at 13:51

## 2023-10-17 RX ADMIN — Medication 2000 MILLIGRAM(S): at 22:21

## 2023-10-17 RX ADMIN — OXYCODONE HYDROCHLORIDE 10 MILLIGRAM(S): 5 TABLET ORAL at 14:30

## 2023-10-17 RX ADMIN — FENTANYL CITRATE 25 MICROGRAM(S): 50 INJECTION INTRAVENOUS at 11:15

## 2023-10-17 RX ADMIN — Medication 1 TABLET(S): at 11:56

## 2023-10-17 RX ADMIN — PANTOPRAZOLE SODIUM 40 MILLIGRAM(S): 20 TABLET, DELAYED RELEASE ORAL at 17:10

## 2023-10-17 RX ADMIN — HYDROMORPHONE HYDROCHLORIDE 0.5 MILLIGRAM(S): 2 INJECTION INTRAMUSCULAR; INTRAVENOUS; SUBCUTANEOUS at 12:10

## 2023-10-17 RX ADMIN — SODIUM CHLORIDE 80 MILLILITER(S): 9 INJECTION INTRAMUSCULAR; INTRAVENOUS; SUBCUTANEOUS at 11:00

## 2023-10-17 RX ADMIN — Medication 81 MILLIGRAM(S): at 17:05

## 2023-10-17 RX ADMIN — Medication 2000 MILLIGRAM(S): at 15:03

## 2023-10-17 NOTE — DISCHARGE NOTE PROVIDER - NSDCDCMDCOMP_GEN_ALL_CORE
Attempted to contact again. Left detailed VM to increase Toprol to 200 mg daily and schedule NV for BP check in 2 weeks.    This document is complete and the patient is ready for discharge.

## 2023-10-17 NOTE — DISCHARGE NOTE PROVIDER - CARE PROVIDER_API CALL
Kevin Mo  Orthopaedic Surgery  [address] [place] [id number]  Phone: [phone]  Fax: [phone]  Follow Up Time:

## 2023-10-17 NOTE — DISCHARGE NOTE PROVIDER - NSDCFUADDINST_GEN_ALL_CORE_FT
The patient will be seen in the office between 2-3 weeks for wound check. Sutures/Staples/Tape will be removed at that time. Patient may shower after post-op day #5. The dressing is to be removed on day #7. The patient will contact the office if the wound becomes red, has increasing pain, develops bleeding or discharge, an injury occurs, or has other concerns. The patient will continue PT consistent with total knee replacement. The patient will continue PLAVIX and  ASPIRIN for DVTP for 6 weeks. The patient will take OXYCODONE for pain control and titrate according to prescription and patient needs. The patient is FULL weight bearing. Elevation of the lower leg is recommended to reduce swelling. The patient will be seen in the office between 2-3 weeks for wound check.   **Your first post-operative visit has been scheduled prior to your admission. PLEASE CONTACT OFFICE TO CONFIRM THE APPOINTMENT DATE. Tape will be removed at that time.  **  The silver based dressing is to be removed 7 days from the date of surgery.   ** CONTACT THE OFFICE IF THE FOLLOWING DEVELOP:  - the dressing becomes soiled or saturated  - you develop a fever greater that 101F  - the wound becomes red or you develop blistering around the wound  * Patient may shower after post-op day #3.   * The patient will continue home PT consistent with  total knee replacement protocol. Transition to outpatient PT will occur at the time of the first office visit.   * The patient will practice knee extension exercises regularly to minimize hamstring contraction.   * The patient is FULL weight bearing.  * The patient will continue ASPIRIN 81mg twice daily for 6 weeks after surgery for blood clot prevention.  . *** While on aspirin, the patient will take daily omeprazole or other similar medication to protect the stomach from irritation.   * The patient will take OXYCODONE AND TYLENOL for pain control and adjust according to prescription and patient needs. Contact the office if pain increases while taking prescribed pain medications or related concerns develop.  * Celebrex will be taken twice daily for 3 weeks for pain control and prevention of excessive bone growth. Additional prescription may be requested at your office follow-up visit.   * The patient will take Senna S while taking oxycodone to prevent narcotic associated constipation.  Additionally, increase water intake (drink at least 8 glasses of water daily) and try adding fiber to the diet by eating fruits, vegetables and foods that are rich in grains. If constipation is experienced, contact the medical/primary care provider to discuss further treatment options.  * To avoid injury at home:  - continue use of rolling walker until cleared by physical therapist  - have family or friend remove all throw rug or objects in hallways that may present a trip hazard.  - if you experience any dizziness or medical concerns, call your medical doctor or  911.  * The implant may activate metal detection devices.

## 2023-10-17 NOTE — CONSULT NOTE ADULT - SUBJECTIVE AND OBJECTIVE BOX
Patient is a 68y old  Female who is s/p R TKA , POD#0. Tolerated procedure well . Seen and examined on PACU .     CC:  R knee chronic pain       HPI:  67 y/o female with PMH of HTN, HDL, asthma, OA, GERD . States the pain has worsened since July. Pain at rest is a 2/10, but with walking can get up to a 8/10. Her pain is achy and relieved by rest. She denies any numbness, weakness, or tingling in the leg. She uses Tylenol for pain with some relief.         PAST MEDICAL & SURGICAL HISTORY:  HTN (hypertension)      Asthma      OA (osteoarthritis)      High cholesterol      Compression of thoracic vertebra      Asthma with allergic rhinitis      H/O dermatomyositis      GERD (gastroesophageal reflux disease)      History of appendectomy      H/O cataract extraction      S/P left knee arthroscopy          Social History:  Tobacco - denies  ETOH - denies   Illicit drug abuse - denies    FAMILY HISTORY:  Family history of early CAD (Father)    FH: breast cancer (Mother)    FH: type 2 diabetes (Sibling)        Allergies    latex (Rash)  Augmentin (Diarrhea)  NSAIDs (Other)    Intolerances        HOME MEDICATIONS :     Albuterol (Eqv-Proventil HFA) 90 mcg/inh inhalation aerosol: 2 puff(s) inhaled 4 times a day as needed for  bronchospasm (17 Oct 2023 06:13)  Flonase 50 mcg/inh nasal spray: 2 spray(s) in each nostril once a day (17 Oct 2023 06:13)  ibandronate 150 mg oral tablet: 1 tab(s) orally once a month (17 Oct 2023 06:13)  losartan 50 mg oral tablet: 1 tab(s) orally once a day (17 Oct 2023 06:13)  montelukast 10 mg oral tablet: 1 tab(s) orally once a day (17 Oct 2023 06:13)  Multiple Vitamins oral tablet: 1 tab(s) orally once a day (17 Oct 2023 06:13)  Prevacid 15 mg oral delayed release capsule: 1 cap(s) orally 2 times a day (17 Oct 2023 06:13)  Symbicort 160 mcg-4.5 mcg/inh inhalation aerosol: 2 puff(s) inhaled 2 times a day (17 Oct 2023 06:13)  tacrolimus ointment BID for past 4 weeks:  (17 Oct 2023 06:13)  Tylenol 500 mg oral tablet: 2 tab(s) orally every 8 hours as needed for  mild pain (17 Oct 2023 06:13)  vitamin d 1000 IU 1 tab daily:  (17 Oct 2023 06:13)  Zetia 10 mg oral tablet: 1 tab(s) orally once a day (at bedtime) (17 Oct 2023 06:13)      REVIEW OF SYSTEMS:    CONSTITUTIONAL: No fever, weight loss, or fatigue  EYES: No eye pain, visual disturbances, or discharge  NECK: No pain or stiffness  RESPIRATORY: No cough, wheezing, chills or hemoptysis; No shortness of breath  CARDIOVASCULAR: No chest pain, palpitations, dizziness, or leg swelling  GASTROINTESTINAL: No abdominal or epigastric pain. No nausea, vomiting, or hematemesis; No diarrhea or constipation. No melena or hematochezia.  GENITOURINARY: No dysuria, frequency, hematuria, or incontinence  NEUROLOGICAL: No headaches, memory loss, loss of strength, numbness, or tremors  SKIN: No itching, burning, rashes, or lesions   LYMPH NODES: No enlarged glands  ENDOCRINE: No heat or cold intolerance; No hair loss  MUSCULOSKELETAL:   PSYCHIATRIC: No depression, anxiety, mood swings, or difficulty sleeping  HEME/LYMPH: No easy bruising, or bleeding gums  ALLERGY AND IMMUNOLOGIC: No hives or eczema    MEDICATIONS  (STANDING):  acetaminophen   IVPB .. 1000 milliGRAM(s) IV Intermittent once  aspirin enteric coated 81 milliGRAM(s) Oral two times a day  ceFAZolin  Injectable. 2000 milliGRAM(s) IV Push <User Schedule>  influenza  Vaccine (HIGH DOSE) 0.7 milliLiter(s) IntraMuscular once  multivitamin 1 Tablet(s) Oral daily  pantoprazole    Tablet 40 milliGRAM(s) Oral before breakfast  polyethylene glycol 3350 17 Gram(s) Oral at bedtime  senna 2 Tablet(s) Oral at bedtime  sodium chloride 0.9%. 1000 milliLiter(s) (80 mL/Hr) IV Continuous <Continuous>    MEDICATIONS  (PRN):  albuterol    90 MICROgram(s) HFA Inhaler 2 Puff(s) Inhalation every 6 hours PRN for bronchospasm  aluminum hydroxide/magnesium hydroxide/simethicone Suspension 30 milliLiter(s) Oral four times a day PRN Indigestion  HYDROmorphone   Tablet 4 milliGRAM(s) Oral every 3 hours PRN Severe Pain (7 - 10)  magnesium hydroxide Suspension 30 milliLiter(s) Oral daily PRN Constipation  ondansetron Injectable 4 milliGRAM(s) IV Push every 6 hours PRN Nausea and/or Vomiting  oxyCODONE    IR 5 milliGRAM(s) Oral every 3 hours PRN Mild Pain (1 - 3)  oxyCODONE    IR 10 milliGRAM(s) Oral every 3 hours PRN Moderate Pain (4 - 6)      Vital Signs Last 24 Hrs  T(C): 36.6 (17 Oct 2023 11:15), Max: 36.8 (17 Oct 2023 05:50)  T(F): 97.8 (17 Oct 2023 11:15), Max: 98.3 (17 Oct 2023 05:50)  HR: 68 (17 Oct 2023 11:55) (67 - 76)  BP: 108/53 (17 Oct 2023 11:55) (104/56 - 156/90)  BP(mean): 68 (17 Oct 2023 11:15) (68 - 89)  RR: 18 (17 Oct 2023 11:55) (13 - 18)  SpO2: 96% (17 Oct 2023 11:55) (96% - 100%)    Parameters below as of 17 Oct 2023 11:55  Patient On (Oxygen Delivery Method): room air        PHYSICAL EXAM:    GENERAL: NAD, well-groomed, well-developed  HEAD:  Atraumatic, Normocephalic  EYES: EOMI, PERRLA, conjunctiva and sclera clear  NECK: Supple, No JVD, Normal thyroid  NERVOUS SYSTEM:  Alert & Oriented X3, Good concentration; Motor Strength 5/5 B/L upper and lower extremities; DTRs 2+ intact and symmetric  CHEST/LUNG: CTA  b/l,  no rales, rhonchi, wheezing, or rubs  HEART: Regular rate and rhythm; No murmurs, rubs, or gallops  ABDOMEN: Soft, Nontender, Nondistended; Bowel sounds present  EXTREMITIES:  2+ Peripheral Pulses, No clubbing, cyanosis, or edema ,   LYMPH: No lymphadenopathy noted  SKIN: No rashes or lesions    LABS:                  RADIOLOGY & ADDITIONAL STUDIES:

## 2023-10-17 NOTE — DISCHARGE NOTE PROVIDER - NSDCMRMEDTOKEN_GEN_ALL_CORE_FT
Albuterol (Eqv-Proventil HFA) 90 mcg/inh inhalation aerosol: 2 puff(s) inhaled 4 times a day as needed for  bronchospasm  Flonase 50 mcg/inh nasal spray: 2 spray(s) in each nostril once a day  ibandronate 150 mg oral tablet: 1 tab(s) orally once a month  losartan 50 mg oral tablet: 1 tab(s) orally once a day  montelukast 10 mg oral tablet: 1 tab(s) orally once a day  Multiple Vitamins oral tablet: 1 tab(s) orally once a day  Prevacid 15 mg oral delayed release capsule: 1 cap(s) orally 2 times a day  Symbicort 160 mcg-4.5 mcg/inh inhalation aerosol: 2 puff(s) inhaled 2 times a day  tacrolimus ointment BID for past 4 weeks:   Tylenol 500 mg oral tablet: 2 tab(s) orally every 8 hours as needed for  mild pain  vitamin d 1000 IU 1 tab daily:   Zetia 10 mg oral tablet: 1 tab(s) orally once a day (at bedtime)   Albuterol (Eqv-Proventil HFA) 90 mcg/inh inhalation aerosol: 2 puff(s) inhaled 4 times a day as needed for  bronchospasm  Aspirin Enteric Coated 81 mg oral delayed release tablet: 1 tab(s) orally 2 times a day  CeleBREX 200 mg oral capsule: 1 cap(s) orally 2 times a day MDD: 2  Flonase 50 mcg/inh nasal spray: 2 spray(s) in each nostril once a day  ibandronate 150 mg oral tablet: 1 tab(s) orally once a month  losartan 50 mg oral tablet: 1 tab(s) orally once a day  montelukast 10 mg oral tablet: 1 tab(s) orally once a day  Multiple Vitamins oral tablet: 1 tab(s) orally once a day  oxyCODONE 5 mg oral tablet: 1 tab(s) orally every 6 hours as needed for  moderate pain MDD: 4  pantoprazole 40 mg oral delayed release tablet: 1 tab(s) orally once a day  Senna 8.6 mg oral tablet: 2 tab(s) orally once a day (at bedtime)  Symbicort 160 mcg-4.5 mcg/inh inhalation aerosol: 2 puff(s) inhaled 2 times a day  tacrolimus ointment BID for past 4 weeks:   Tylenol 500 mg oral tablet: 2 tab(s) orally every 8 hours as needed for  mild pain  vitamin d 1000 IU 1 tab daily:   Zetia 10 mg oral tablet: 1 tab(s) orally once a day (at bedtime)

## 2023-10-17 NOTE — DISCHARGE NOTE PROVIDER - EXTENDED VTE YES NO FOR MLM ENOXAPARIN
Chief Complaint   Patient presents with   • Office Visit   • Hip   • Headache   • Neck       Date of Injury: 08/10/2022  Initial Treatment Date: 08/10/2022  Date Last Seen: 09/29/2022  Mechanism of Onset: other  Occupation: [employer]  Referred by: Enzo Cabrera DC  Primary Care Physician: Morenita Mancia MD  Date informed consent signed: 03/22/2021  I have reviewed the past medical history, family history, social history, medications and allergies listed in the medical record as obtained by my nursing staff and support staff and agree with their documentation.  Vicky  reports that she has been smoking cigarettes. She has a 3.75 pack-year smoking history. She has never used smokeless tobacco.  Vicky is allergic to bee, codeine, penicillins, shell fish, clindamycin, mobic, chloraprep one step, and hydrocodone.  Visit Number of Current Episode: 8  Initial Pain Rating: 3/10      COVID-19 Screening:     • Does the patient OR patient’s household members have any of the following symptoms?  o Temperature: Fever ?100.0°F or ?37.8°C?  No  o Respiratory symptoms: New or worsening cough, shortness of breath, or sore throat? No  o GI symptoms: New onset of nausea, vomiting or diarrhea?  No  o Miscellaneous: New onset of loss of taste or smell, chills, repeated shaking with chills, muscle pain or headache?  No  • Has the patient or a household member tested positive for COVID-19 in the last 14 days?  No  • Has the patient or a household member been tested for COVID-19 and are waiting for the results?  No    SUBJECTIVE:  Fela' is a pleasant 44 year old female that presents to our office today for treatment of neck, hip and left shoulder pain. Patient rates her pain today at 5/10 with 10 being worst. Patient states she has a big knot under left shoulder blade. Her low back and hip is tight. She had a head cold over the weekend and her neck is sore.     Relevant Diagnostic Imaging:   MRI (Lumbar)  12/08/2021; (X-ray (elbow) 04/13/2019; X-ray (Cervical) 03/15/2018; MRI (lumbar) 11/15/2018    OBJECTIVE FINDINGS:   Problem Focused Examination revealed:    (C-spine) Cervical spine facet joint function is within normal limits right C 2/3 facet joint. The following muscles were examined for normal flexibility and tone; right and left upper trapezius muscle: right and left scalene muscle; right and left levator scapulae muscle; deep neck flexor muscle; right and left Sterno cleidomastoid muscle(SCM); right and left suboccipital muscle; these muscles were within normal limits except for her left upper trapezius muscle, left rhomboids, left levator scapulae muscle, left upper cervical paraspinals muscles.    (Shoulder) Scapulothoracic,acromioclavicular and glenohumeral joint range of motion is within normal limits; The following muscles were examined for normal flexibility and tone: right and left supraspinatus muscle; right and left subscapularis muscle; right and left biceps muscle; right and left teres muscle.; left and right pectoral minor muscle.; left and right pectoral major muscle.; left and right infraspinatus muscle. These muscles were found to be within normal limits except for her left subscapularis muscle, right supraspinatus muscle that exhibited limited flexibility and were hypertonic at rest.    (Thoracic spine) Thoracic spine facet joint function is within normal limits except for her left 3rd and 4th and left 7th and 8th costovertebral joint    that exhibited limited passive range of motion and segmental restriction and tenderness upon palpation; The following muscles were examined for normal flexibility and tone; right and left rhomboid muscle; right and left serratus muscle; left and right latissimus dorsi muscle; These muscles were within normal limits     (Lumbar and Pelvic Spine)Lumbar spine facet joint function is within normal limits; Sacroiliac joint function is within normal limits; The  following muscles were examined for flexibility and tone at rest; right and left hip flexor muscle; right and left hip adductor muscle; right and left hip rotator muscle; right and left piriformis muscle; right and left hamstring muscle; right and left Gluteus medius muscle and gluteus avelina muscle; right and left quadratus lumborum muscle; left and right Tensor fasciae latae muscle; left and right internal hip rotators muscle; right and left quadriceps muscle.  These muscles were found to be within normal limits except for her right gluteus avelina muscle, left and right tensor fasciae latae (TFL) muscle that exhibited limited flexibility and were hypertonic at rest.    (Hip)  Range of motion is within normal limits    Orthopedic/Neurological tests:   Positive [facility]'s test in right knee  Percussion of the calcaneal heel on the right      Directional Preference:  None noted    Assessment:   1. Cervical somatic dysfunction    2. Chronic bilateral low back pain without sciatica    3. Pelvic somatic dysfunction    4. Pain in thoracic spine    5. Thoracic region somatic dysfunction    6. Cervicalgia        Complicating Factors/Comorbidities:   Patient has long history of over recruiting her thoracolumbar extensor muscles    Plan:  Patient was evaluated and then treated with manipulation to her cervical spine via diversified manipulation technique to her cervical and upper thoracic spine via Velasquez distraction technique and to her pelvic spine via manual chiropractic manipulation technique to improve function and passive range of motion of facet joints.  Patient also treated with contract/relax stretch to muscle noted as taut in objective findings to improve flexibility and decrease strain to spinal structures.     Therapeutic Exercise/Rehab/Modalities performed today:   Exercise supervision and instruction was performed by our chiropractic resident.  At the direction of compliance, we will not be billing for this  service today.    Patient Instruction/Education:    None today    Patient stated understanding of, and was in agreement with, the discussed instructions.  Goals of Care: Goal of care is to improve muscular and skeletal function and provide symptom relief.   Additional Goals Include and to be obtained by end of this plan of care.   To be obtained by end of this plan of care:  1. Patient independent with modified and progressed home exercise program.  2. Patient will decrease symptoms by 60-80% of current Visual Analog Pain Scale Score.  3. Patient’s active range-of-motion will be within normal limits with no/minimal pain.   4. Patient’s DOD/VA pain questionnaire will be decreased by 50-70%.     Other treatment options discussed with patient:   None today     Patient responded well to treatment today    Patient is to return next week for continued care and treatment of her condition consistent with plan of care.     Length of Visit: 15 min.    On 10/3/2022, Christa OLIVEIRA scribed the services personally performed by Enzo Cabrera DC     The documentation recorded by the scribe accurately and completely reflects the service(s) I personally performed and the decisions made by me.          ,

## 2023-10-17 NOTE — BRIEF OPERATIVE NOTE - NSICDXBRIEFPREOP_GEN_ALL_CORE_FT
Patient:   YULIYA ZAMARRIPA            MRN: [id number]            FIN: [id number]               Age:   86 years     Sex:  FEMALE     :  [birth year]   Associated Diagnoses:   None   Author:   FELIBERTO, [facility] Status   Current medications:  (Selected)   Prescriptions  Prescribed  traMADol oral 50 mg tablet: 100 mg = 2 tab, Oral, Q4H, PRN for pain, Tab, # 24 tab, 0 Refills, Maintenance  traMADol oral 50 mg tablet: 50 mg = 1 tab, Oral, Q4H, PRN for pain, Tab, # 24 tab, 0 Refills, Maintenance  Documented Medications  Documented  Bisac-Evac 10 mg rectal suppository: 10 mg = 1 suppository, Rectal, Daily, PRN as needed for constipation, Maintenance  Colace (sodium) oral 100 mg capsule: 100 mg = 1 cap, Oral, BID, Cap, Maintenance  DuoNeb inhalation 2.5-0.5 mg/3 mL solution: = 3 mL, Nebulizer, Q4H Awake x4, Inhalation, Maintenance  DuoNeb inhalation 2.5-0.5 mg/3 mL solution: = 3 mL, Nebulizer, Q4H, PRN wheezing, Inhalation, Maintenance  Lialda oral 1,200 mg DR tablet: 1.2 gm = 1 tab, Oral, QAM, Maintenance  Milk of Magnesia oral 8% suspension: 2.4 gm = 30 mL, Oral, Daily, PRN constipation, Suspension, Maintenance  Ocuvite: = 1 tab, Oral, Daily, Maintenance  Protonix oral 40 mg DR tablet: 40 mg = 1 tab, Oral, Daily [before breakfast], Tab DR, Maintenance  Reglan oral 10 mg tablet: 10 mg = 1 tab, Oral, Q6H, PRN nausea, Maintenance  Santyl topical 250 unit/gm ointment: = 1 application, Topical, Daily, wound care, Maintenance  Silvadene topical 1% cream: = 1 application, Topical, Daily, Apply to surgical wound post cleanse with cleanse with NS, Cream, Maintenance  Tylenol Extra Strength oral 500 mg tablet: 1,000 mg = 2 tab, Oral, Q6H, PRN pain or fever, Tab, Maintenance  Vitamin D3 2000 intl units oral tablet: 2,000 unit = 1 tab, Oral, Daily, Maintenance  dicyclomine oral 10 mg capsule: 10 mg = 1 cap, Oral, QID, PRN abdominal pain, Cap, Maintenance  enoxaparin 40 mg/0.4 mL injectable solution: 40 mg = 0.4 mL,  Subcutaneous, Q24H, Injection, Maintenance  folic acid oral 1 mg tablet: 1 mg = 1 tab, Oral, Daily, Maintenance  hydrocortisone topical 2.5% cream (Anusol-HC): = 1 application, Topical, BID, for hemorroids, Cream, # 20 gm, Maintenance  loperamide oral 2 mg capsule (Imodium): 4 mg = 2 cap, Oral, Q6H, PRN for loose stool, Cap, Maintenance  metoprolol tartrate oral 25 mg tablet (Lopressor): 25 mg = 1 tab, Oral, Q12H, Tab, Maintenance  polyethylene glycol 3350 oral powder for solution: 17 gm, Oral, Daily, Powder Recon, Maintenance  pravastatin oral 20 mg tablet (Pravachol): 20 mg = 1 tab, Oral, Q Bedtime, Tab, Maintenance      Physical Examination   VS/Measurements        Vitals between:   15-FEB-2017 02:01:44   TO   16-FEB-2017 02:01:44                   LAST RESULT MINIMUM MAXIMUM  Temperature 36.7 36.5 37  Heart Rate 94 80 102  Respiratory Rate 18 16 18  NISBP           132 132 154  NIDBP           63 63 90  SpO2                    92 92 93     General:  No acute distress.    Eye:  Extraocular movements are intact.    HENT:  Normocephalic.    Neck:       Trachea: Midline.    Respiratory:  Breath sounds are equal, Symmetrical chest wall expansion.    Cardiovascular:  S1, S2.    Gastrointestinal:  Soft, Non-tender, Non-distended, Normal bowel sounds.    Musculoskeletal:  No swelling.    Neurologic:  Moving all extremities.    Psychiatric:  Cooperative.       Impression and Plan   Dx and Plan:  Diagnosis         DISCHARGE DIAGNOSES and HOSPITAL COURSE      toxic metabolic encephelopathy :   Suspects medication effects.  Offending medications or been held.  No signs or symptoms of systemic infection.  resolved. neurology signed off.    Recent C2 fracture: Patient still an external fixator.    Hypertension: Continue metoprolol.    Dyslipidemia: Continue statin..    History of anemia: Hemoglobin near baseline.    Recent Multiple rib fractures: Appear to be healing.    Recent left upper extremity hand degloving: Plastic  surgery and hand surgery following as an outpatient.    severe protein calorie malnutrition - po intake <75%, 23% wt loss,  severe muscle wasting, BMI 18.  continue supplements.    DVT prophylaxis with SCDs and subcutaneous Lovenox.    return to [facility] when bed available    family choosing new e[facility].  confirmed  neurology cleared for discharge    patient education performed    DISCHARGE MEDICATIONS:  see list above titled \"current medications\".  including the prescriptions and the documented meds.  these are the medicines patient was discharged with.     FOLLOW UP PLAN:  as ordered in chart    discharge time 35 min    Jamie Hurtado MD  Internal Medicine Hospitalist  [facility]  24 Hour Hospitalist Service with Same Physician Continuity of Care  [phone]  Accepting HIPPA Compliant Text via [employer]       9   .     .             Electronically Signed On 02/16/2017 02:03  __________________________________________________   JAMIE DAO     PRE-OP DIAGNOSIS:  Localized osteoarthritis of right knee 17-Oct-2023 09:51:39  Kevin Mo

## 2023-10-17 NOTE — DISCHARGE NOTE PROVIDER - HOSPITAL COURSE
The patient underwent a XXXXXXXXX TOTAL KNEE REPLACEMENT on XXXXXXXX. The patient received antibiotics consistent with SCIP guidelines. The patient underwent the procedure and had no intra-operative complications. Post-operatively, the patient was seen by medicine and PT. The patient received plavix and aspirin for DVTP. The patient received pain medications per orthopedic pain managment protocol and the pain was appropriately controlled. The patient did not have any post-operative medical complications. The patient was discharged in stable condition. The patient underwent a right TOTAL KNEE REPLACEMENT on 10/17/23. The patient received antibiotics consistent with SCIP guidelines. The patient underwent the procedure and had no intra-operative complications. Post-operatively, the patient was seen by medicine and PT. The patient received aspirin 81 mg for DVTP. The patient received pain medications per orthopedic pain management protocol and the pain was appropriately controlled. The patient did not have any post-operative medical complications. The patient was discharged in stable condition.

## 2023-10-17 NOTE — PHYSICAL THERAPY INITIAL EVALUATION ADULT - RANGE OF MOTION EXAMINATION, REHAB EVAL
except right knee 0 to 90 deg/bilateral upper extremity ROM was WFL (within functional limits)/bilateral lower extremity ROM was WFL (within functional limits)

## 2023-10-17 NOTE — PATIENT PROFILE ADULT - FALL HARM RISK - HARM RISK INTERVENTIONS
Assistance with ambulation/Assistance OOB with selected safe patient handling equipment/Communicate Risk of Fall with Harm to all staff/Discuss with provider need for PT consult/Monitor gait and stability/Provide patient with walking aids - walker, cane, crutches/Reinforce activity limits and safety measures with patient and family/Sit up slowly, dangle for a short time, stand at bedside before walking/Tailored Fall Risk Interventions/Use of alarms - bed, chair and/or voice tab/Visual Cue: Yellow wristband and red socks/Bed in lowest position, wheels locked, appropriate side rails in place/Call bell, personal items and telephone in reach/Instruct patient to call for assistance before getting out of bed or chair/Non-slip footwear when patient is out of bed/Aldie to call system/Physically safe environment - no spills, clutter or unnecessary equipment/Purposeful Proactive Rounding/Room/bathroom lighting operational, light cord in reach

## 2023-10-17 NOTE — PHYSICAL THERAPY INITIAL EVALUATION ADULT - ADDITIONAL COMMENTS
Pt reports living with spouse (reports sister will also be staying with her for a few months) in a house   apartment with 1 DEANDAR c no rail, and bedroom on the 2nd level with 12 steps c rail. Pt reports planning to stay on main level. Pt amb without device and is independent with functional mobility, ADLs, and IADLs. Pt drives and is currently working part time. Pt has support of family. Pt owns RW and SAC.

## 2023-10-17 NOTE — CONSULT NOTE ADULT - ASSESSMENT
69 y/o female with PMH of HTN, HDL, asthma, OA, GERD . States the pain has worsened since July. Pain at rest is a 2/10, but with walking can get up to a 8/10. Her pain is achy and relieved by rest. She denies any numbness, weakness, or tingling in the leg. She uses Tylenol for pain with some relief.     1. R knee chronic pain / OA ,   S/P R TKA ,   PT/OT/pain mgmt  DVT prophylaxis- as per ortho  Abx as per SCIP  Incentive spirometry  Prophylaxis of opioid  induced constipation.  Wound care/ WB status as per ortho     2. HTN- restart Losartan POD #2 with parameters     3. Hx Of Asthma - stable - continue Symbicort/ Flonase/ Montelukas / Proventil PRN    4. Hx of GERD - continue Prevacid 15 mg BID     5. OP- continue Ibandronate 150 mg once a month     6. DVT prophylaxis  - as per ortho protocol  Opioid induced constipation  prophylaxis - bowel regimen     Thank you for the courtesy of this consult ,   will follow along with you .

## 2023-10-17 NOTE — DISCHARGE NOTE PROVIDER - NSDCQMSTAIRS_GEN_ALL_CORE
What to watch out for are: regular contractions every 5 min, vaginal bleeding, decreased fetal movement, or leakage of fluid.  Please call the office or go to L&D if you develop any of these signs and symptoms.      I will see you for your follow up appointment.  Please feel free to call if you have any questions or concerns.      Thanks,  Claudia Marvin, DO    
Yes

## 2023-10-17 NOTE — DISCHARGE NOTE PROVIDER - NSDCFUSCHEDAPPT_GEN_ALL_CORE_FT
Elebiary, Yeon J  [facility]  ORTHOSURG [address]  Scheduled Appointment: 11/07/2023    Kevin Mo  [facility]  ORTHOSURG [address]  Scheduled Appointment: 12/27/2023

## 2023-10-18 ENCOUNTER — TRANSCRIPTION ENCOUNTER (OUTPATIENT)
Age: 68
End: 2023-10-18

## 2023-10-18 VITALS
DIASTOLIC BLOOD PRESSURE: 63 MMHG | OXYGEN SATURATION: 100 % | HEART RATE: 74 BPM | RESPIRATION RATE: 18 BRPM | SYSTOLIC BLOOD PRESSURE: 107 MMHG | TEMPERATURE: 98 F

## 2023-10-18 PROCEDURE — C1776: CPT

## 2023-10-18 PROCEDURE — C1713: CPT

## 2023-10-18 PROCEDURE — 99232 SBSQ HOSP IP/OBS MODERATE 35: CPT

## 2023-10-18 PROCEDURE — 86901 BLOOD TYPING SEROLOGIC RH(D): CPT

## 2023-10-18 PROCEDURE — 97110 THERAPEUTIC EXERCISES: CPT

## 2023-10-18 PROCEDURE — 27447 TOTAL KNEE ARTHROPLASTY: CPT

## 2023-10-18 PROCEDURE — 36415 COLL VENOUS BLD VENIPUNCTURE: CPT

## 2023-10-18 PROCEDURE — 86900 BLOOD TYPING SEROLOGIC ABO: CPT

## 2023-10-18 PROCEDURE — 86850 RBC ANTIBODY SCREEN: CPT

## 2023-10-18 PROCEDURE — 73560 X-RAY EXAM OF KNEE 1 OR 2: CPT

## 2023-10-18 PROCEDURE — 97116 GAIT TRAINING THERAPY: CPT

## 2023-10-18 RX ORDER — ASPIRIN/CALCIUM CARB/MAGNESIUM 324 MG
1 TABLET ORAL
Qty: 84 | Refills: 0
Start: 2023-10-18 | End: 2023-11-28

## 2023-10-18 RX ORDER — CELECOXIB 200 MG/1
1 CAPSULE ORAL
Qty: 42 | Refills: 0
Start: 2023-10-18 | End: 2023-11-07

## 2023-10-18 RX ORDER — LANSOPRAZOLE 15 MG/1
1 CAPSULE, DELAYED RELEASE ORAL
Refills: 0 | DISCHARGE

## 2023-10-18 RX ORDER — OXYCODONE HYDROCHLORIDE 5 MG/1
5 TABLET ORAL ONCE
Refills: 0 | Status: DISCONTINUED | OUTPATIENT
Start: 2023-10-18 | End: 2023-10-18

## 2023-10-18 RX ORDER — OXYCODONE HYDROCHLORIDE 5 MG/1
1 TABLET ORAL
Qty: 28 | Refills: 0
Start: 2023-10-18 | End: 2023-10-24

## 2023-10-18 RX ORDER — SENNA PLUS 8.6 MG/1
2 TABLET ORAL
Qty: 14 | Refills: 0
Start: 2023-10-18 | End: 2023-10-24

## 2023-10-18 RX ORDER — PANTOPRAZOLE SODIUM 20 MG/1
1 TABLET, DELAYED RELEASE ORAL
Qty: 42 | Refills: 0
Start: 2023-10-18

## 2023-10-18 RX ADMIN — Medication 400 MILLIGRAM(S): at 05:52

## 2023-10-18 RX ADMIN — OXYCODONE HYDROCHLORIDE 5 MILLIGRAM(S): 5 TABLET ORAL at 02:37

## 2023-10-18 RX ADMIN — OXYCODONE HYDROCHLORIDE 5 MILLIGRAM(S): 5 TABLET ORAL at 05:52

## 2023-10-18 RX ADMIN — OXYCODONE HYDROCHLORIDE 5 MILLIGRAM(S): 5 TABLET ORAL at 06:52

## 2023-10-18 RX ADMIN — OXYCODONE HYDROCHLORIDE 5 MILLIGRAM(S): 5 TABLET ORAL at 11:54

## 2023-10-18 RX ADMIN — PANTOPRAZOLE SODIUM 40 MILLIGRAM(S): 20 TABLET, DELAYED RELEASE ORAL at 05:52

## 2023-10-18 RX ADMIN — Medication 1000 MILLIGRAM(S): at 06:26

## 2023-10-18 RX ADMIN — OXYCODONE HYDROCHLORIDE 5 MILLIGRAM(S): 5 TABLET ORAL at 11:58

## 2023-10-18 RX ADMIN — OXYCODONE HYDROCHLORIDE 5 MILLIGRAM(S): 5 TABLET ORAL at 10:11

## 2023-10-18 RX ADMIN — Medication 81 MILLIGRAM(S): at 05:52

## 2023-10-18 RX ADMIN — OXYCODONE HYDROCHLORIDE 5 MILLIGRAM(S): 5 TABLET ORAL at 01:37

## 2023-10-18 RX ADMIN — OXYCODONE HYDROCHLORIDE 5 MILLIGRAM(S): 5 TABLET ORAL at 11:00

## 2023-10-18 NOTE — DISCHARGE NOTE NURSING/CASE MANAGEMENT/SOCIAL WORK - PATIENT PORTAL LINK FT
You can access the FollowMyHealth Patient Portal offered by [facility] by registering at the following website: [email or web address]. By joining [employer]’s FollowMyHealth portal, you will also be able to view your health information using other applications (apps) compatible with our system.

## 2023-10-18 NOTE — PROGRESS NOTE ADULT - ASSESSMENT
69 y/o female with PMH of HTN, HDL, asthma, OA, GERD . States the pain has worsened since July. Pain at rest is a 2/10, but with walking can get up to a 8/10. Her pain is achy and relieved by rest. She denies any numbness, weakness, or tingling in the leg. She uses Tylenol for pain with some relief.     1. R knee chronic pain / OA ,   S/P R TKA ,   PT/OT/pain mgmt  DVT prophylaxis- as per ortho  Abx as per SCIP  Incentive spirometry  Prophylaxis of opioid  induced constipation.  Wound care/ WB status as per ortho     2. HTN- restart Losartan POD #2 with parameters     3. Hx Of Asthma - stable - continue Symbicort/ Flonase/ Montelukas / Proventil PRN    4. Hx of GERD - continue Prevacid 15 mg BID , not formulary , Protonix 40 mg BID ordered .     5. OP- continue Ibandronate 150 mg once a month     6. DVT prophylaxis  - as per ortho protocol  Opioid induced constipation  prophylaxis - bowel regimen     Dispo plan is Home - likely today .      Medically stable to d/c once cleared by physical therapy / ortho .

## 2023-10-18 NOTE — PROGRESS NOTE ADULT - SUBJECTIVE AND OBJECTIVE BOX
Ortho Post Op Check    Name: VAL DANIELS    MR #: [id number]    Procedure: Right total knee arthroplasty   Surgeon: Dr Mo    Pt comfortable without complaints, pain controlled. She ambulated to the bathroom with PT but felt mildly lightheaded and returned to bed. She was also nauseous but is feeling better now and able to tolerate some food.  Denies CP, SOB, N/V, numbness/tingling     MEDICATIONS  (STANDING):  acetaminophen   IVPB .. 1000 milliGRAM(s) IV Intermittent once  aspirin enteric coated 81 milliGRAM(s) Oral two times a day  ceFAZolin  Injectable. 2000 milliGRAM(s) IV Push <User Schedule>  influenza  Vaccine (HIGH DOSE) 0.7 milliLiter(s) IntraMuscular once  multivitamin 1 Tablet(s) Oral daily  pantoprazole    Tablet 40 milliGRAM(s) Oral two times a day  polyethylene glycol 3350 17 Gram(s) Oral at bedtime  senna 2 Tablet(s) Oral at bedtime  sodium chloride 0.9%. 1000 milliLiter(s) (80 mL/Hr) IV Continuous <Continuous>    MEDICATIONS  (PRN):  albuterol    90 MICROgram(s) HFA Inhaler 2 Puff(s) Inhalation every 6 hours PRN for bronchospasm  aluminum hydroxide/magnesium hydroxide/simethicone Suspension 30 milliLiter(s) Oral four times a day PRN Indigestion  HYDROmorphone   Tablet 4 milliGRAM(s) Oral every 3 hours PRN Severe Pain (7 - 10)  magnesium hydroxide Suspension 30 milliLiter(s) Oral daily PRN Constipation  ondansetron Injectable 4 milliGRAM(s) IV Push every 6 hours PRN Nausea and/or Vomiting  oxyCODONE    IR 5 milliGRAM(s) Oral every 3 hours PRN Mild Pain (1 - 3)  oxyCODONE    IR 10 milliGRAM(s) Oral every 3 hours PRN Moderate Pain (4 - 6)      General Exam:  Vital Signs Last 24 Hrs  T(C): 36.3 (10-17-23 @ 15:49), Max: 36.3 (10-17-23 @ 15:49)  T(F): 97.3 (10-17-23 @ 15:49), Max: 97.3 (10-17-23 @ 15:49)  HR: 66 (10-17-23 @ 15:49) (66 - 68)  BP: 125/73 (10-17-23 @ 15:49) (108/53 - 125/73)  BP(mean): --  RR: 19 (10-17-23 @ 15:49) (18 - 19)  SpO2: 94% (10-17-23 @ 15:49) (94% - 96%)    General: Pt Alert and oriented, NAD, controlled pain.  Dressings C/D/I. No bleeding.  Pulses: 2+ dorsalis pedis pulse. Cap refill < 2 sec.  Sensation: Grossly intact to light touch without deficit.  Motor: + EHL/FHL/TA/GS    Post-op X-Ray: prosthesis in place. No fx/dislocation    A/P: 68yFemale POD#0 s/p right total knee arthroplasty   - Stable  - Pain Control  - DVT ppx: SCDs/ASA  - Post op abx:ancef  - PT/OT  - Weight bearing status: WBAT  - medicine following
   VAL DANIELS  [id number]    History: 68y Female is status post right total knee arthroplasty POD # 1. Patient is doing well and is comfortable. The patient's pain is controlled using the prescribed pain medications. The patient is participating in physical therapy.  Patient with history of esophagitis, will be given protonix on discharge for stomach protection.  Denies CP, SOB, dizziness, HA, fever/chills, numbness or tingling. No new complaints.    Vital Signs Last 24 Hrs  T(C): 36.5 (18 Oct 2023 04:51), Max: 36.6 (17 Oct 2023 11:15)  T(F): 97.7 (18 Oct 2023 04:51), Max: 97.8 (17 Oct 2023 11:15)  HR: 77 (18 Oct 2023 04:51) (66 - 81)  BP: 119/69 (18 Oct 2023 04:51) (101/60 - 125/73)  BP(mean): 68 (17 Oct 2023 11:15) (68 - 89)  RR: 18 (18 Oct 2023 04:51) (13 - 19)  SpO2: 96% (18 Oct 2023 04:51) (92% - 100%)    Parameters below as of 18 Oct 2023 04:51  Patient On (Oxygen Delivery Method): room air                  General: Alert, awake, NAD  Physical exam: The right knee dressing is clean, dry and intact. No drainage, discharge, erythema, blistering or ecchymosis noted.   The calf is supple nontender b/l.   SILT. +AT/GC/EHL/FHL.   2+ DP pulse. BCR. No cyanosis.    Plan:   - DVT prophylaxis as prescribed, including use of compression devices and ankle pumps  - Continue physical therapy  - Weight bearing status of surgical extremity: WBAT RLE  - Incentive spirometry encouraged  - Pain control as clinically indicated  - Discharge planning – anticipated discharge is Home 
Patient seen and examined . S/p R TKA , POD # 1. Pain well controlled , denies n/v, voiding without difficulty ,   participating with physical therapy .     CC : R knee chronic pain , postop pain well controlled       MEDICATIONS  (STANDING):  acetaminophen     Tablet .. 975 milliGRAM(s) Oral every 8 hours  aspirin enteric coated 81 milliGRAM(s) Oral two times a day  influenza  Vaccine (HIGH DOSE) 0.7 milliLiter(s) IntraMuscular once  montelukast 10 milliGRAM(s) Oral daily  multivitamin 1 Tablet(s) Oral daily  pantoprazole    Tablet 40 milliGRAM(s) Oral two times a day  polyethylene glycol 3350 17 Gram(s) Oral at bedtime  senna 2 Tablet(s) Oral at bedtime  sodium chloride 0.9%. 1000 milliLiter(s) (80 mL/Hr) IV Continuous <Continuous>    MEDICATIONS  (PRN):  albuterol    90 MICROgram(s) HFA Inhaler 2 Puff(s) Inhalation every 6 hours PRN for bronchospasm  aluminum hydroxide/magnesium hydroxide/simethicone Suspension 30 milliLiter(s) Oral four times a day PRN Indigestion  HYDROmorphone   Tablet 4 milliGRAM(s) Oral every 3 hours PRN Severe Pain (7 - 10)  magnesium hydroxide Suspension 30 milliLiter(s) Oral daily PRN Constipation  ondansetron Injectable 4 milliGRAM(s) IV Push every 6 hours PRN Nausea and/or Vomiting  oxyCODONE    IR 5 milliGRAM(s) Oral every 3 hours PRN Mild Pain (1 - 3)  oxyCODONE    IR 10 milliGRAM(s) Oral every 3 hours PRN Moderate Pain (4 - 6)      RADIOLOGY & ADDITIONAL TESTS:          REVIEW OF SYSTEMS:    ?    Vital Signs Last 24 Hrs  T(C): 36.8 (18 Oct 2023 09:18), Max: 36.8 (18 Oct 2023 09:18)  T(F): 98.3 (18 Oct 2023 09:18), Max: 98.3 (18 Oct 2023 09:18)  HR: 74 (18 Oct 2023 09:18) (66 - 81)  BP: 107/63 (18 Oct 2023 09:18) (101/60 - 125/73)  BP(mean): 68 (17 Oct 2023 11:15) (68 - 89)  RR: 18 (18 Oct 2023 09:18) (13 - 19)  SpO2: 100% (18 Oct 2023 09:18) (92% - 100%)    Parameters below as of 18 Oct 2023 09:18  Patient On (Oxygen Delivery Method): room air      PHYSICAL EXAM:    GENERAL: NAD, well-groomed, well-developed  HEAD:  Atraumatic, Normocephalic  EYES: EOMI, PERRLA, conjunctiva and sclera clear  NECK: Supple, No JVD, Normal thyroid  NERVOUS SYSTEM:  Alert & Oriented X3, no focal deficit  CHEST/LUNG: CTA b/l ,  no  rales, rhonchi, wheezing, or rubs  HEART: Regular rate and rhythm; No murmurs, rubs, or gallops  ABDOMEN: Soft, Nontender, Nondistended; Bowel sounds present  EXTREMITIES:  2+ Peripheral Pulses, No clubbing, cyanosis, or edema,   R knee dry and clean dressing +   LYMPH: No lymphadenopathy noted  SKIN: No rashes or lesions

## 2023-10-18 NOTE — DISCHARGE NOTE NURSING/CASE MANAGEMENT/SOCIAL WORK - NSDCFUADDAPPT_GEN_ALL_CORE_FT
follow up appointment with Dr. Mo Tuchuyday Nov 7th at 3:30pm in [facility] [address], requested change via the patient

## 2023-10-18 NOTE — DISCHARGE NOTE NURSING/CASE MANAGEMENT/SOCIAL WORK - NSDCPEFALRISK_GEN_ALL_CORE
For information on Fall & Injury Prevention, visit: [email or web address] OR  [email or web address] OR  https://www.cdc.gov/steadi/patient.html

## 2023-10-19 ENCOUNTER — TRANSCRIPTION ENCOUNTER (OUTPATIENT)
Age: 68
End: 2023-10-19

## 2023-10-23 ENCOUNTER — TRANSCRIPTION ENCOUNTER (OUTPATIENT)
Age: 68
End: 2023-10-23

## 2023-10-24 ENCOUNTER — TRANSCRIPTION ENCOUNTER (OUTPATIENT)
Age: 68
End: 2023-10-24

## 2023-11-03 ENCOUNTER — TRANSCRIPTION ENCOUNTER (OUTPATIENT)
Age: 68
End: 2023-11-03

## 2023-11-07 ENCOUNTER — APPOINTMENT (OUTPATIENT)
Dept: ORTHOPEDIC SURGERY | Facility: CLINIC | Age: 68
End: 2023-11-07
Payer: COMMERCIAL

## 2023-11-07 VITALS
WEIGHT: 168 LBS | BODY MASS INDEX: 32.98 KG/M2 | HEART RATE: 91 BPM | HEIGHT: 60 IN | SYSTOLIC BLOOD PRESSURE: 142 MMHG | DIASTOLIC BLOOD PRESSURE: 82 MMHG

## 2023-11-07 PROCEDURE — 99024 POSTOP FOLLOW-UP VISIT: CPT

## 2023-11-07 PROCEDURE — 73562 X-RAY EXAM OF KNEE 3: CPT | Mod: RT

## 2023-11-09 ENCOUNTER — TRANSCRIPTION ENCOUNTER (OUTPATIENT)
Age: 68
End: 2023-11-09

## 2023-12-10 ENCOUNTER — NON-APPOINTMENT (OUTPATIENT)
Age: 68
End: 2023-12-10

## 2023-12-22 ENCOUNTER — RX RENEWAL (OUTPATIENT)
Age: 68
End: 2023-12-22

## 2023-12-27 ENCOUNTER — APPOINTMENT (OUTPATIENT)
Dept: ORTHOPEDIC SURGERY | Facility: CLINIC | Age: 68
End: 2023-12-27
Payer: COMMERCIAL

## 2023-12-27 VITALS
DIASTOLIC BLOOD PRESSURE: 78 MMHG | HEART RATE: 90 BPM | WEIGHT: 165 LBS | BODY MASS INDEX: 32.39 KG/M2 | HEIGHT: 60 IN | SYSTOLIC BLOOD PRESSURE: 131 MMHG

## 2023-12-27 PROCEDURE — 73562 X-RAY EXAM OF KNEE 3: CPT | Mod: 26,RT

## 2023-12-27 PROCEDURE — 99024 POSTOP FOLLOW-UP VISIT: CPT

## 2023-12-27 NOTE — HISTORY OF PRESENT ILLNESS
[Chills] : no chills [Constipation] : no constipation [Diarrhea] : no diarrhea [Dysuria] : no dysuria [Fever] : no fever [Nausea] : no nausea [Vomiting] : no vomiting [Clean/Dry/Intact] : clean, dry and intact [Healed] : healed [Discharge] : absent of discharge [Swelling] : not swollen [Dehiscence] : not dehisced [de-identified] : s/p Right total knee arthroplasty. Computer-assisted tibial resection, OrthAlign procedure. 10/17/2023 [de-identified] : The patient is 2 and half months postop right total knee arthroplasty.  She reports she is doing very well and is finished physical therapy.  She takes Tylenol for pain as needed [de-identified] : Right knee exam range of motion 0/115. [de-identified] : Three-view imaging of the right knee shows well-fixed implants without subsidence [de-identified] : Postoperatively, the patient is doing well, has excellent pain control and is showing no signs of infection. [de-identified] : Patient will continue with the physical therapy and low impact exercise. I discussed use of antibiotic before dental work for 2 years.  The patient is scheduled for left total knee arthroplasty in April 2024 and will follow-up in March for her left knee.

## 2023-12-29 ENCOUNTER — TRANSCRIPTION ENCOUNTER (OUTPATIENT)
Age: 68
End: 2023-12-29

## 2024-01-11 ENCOUNTER — NON-APPOINTMENT (OUTPATIENT)
Age: 69
End: 2024-01-11

## 2024-01-17 ENCOUNTER — TRANSCRIPTION ENCOUNTER (OUTPATIENT)
Age: 69
End: 2024-01-17

## 2024-02-20 ENCOUNTER — OFFICE (OUTPATIENT)
Dept: URBAN - METROPOLITAN AREA CLINIC 115 | Facility: CLINIC | Age: 69
Setting detail: OPHTHALMOLOGY
End: 2024-02-20
Payer: COMMERCIAL

## 2024-02-20 DIAGNOSIS — H01.014: ICD-10-CM

## 2024-02-20 DIAGNOSIS — H01.015: ICD-10-CM

## 2024-02-20 DIAGNOSIS — H01.011: ICD-10-CM

## 2024-02-20 DIAGNOSIS — H01.012: ICD-10-CM

## 2024-02-20 DIAGNOSIS — Z96.1: ICD-10-CM

## 2024-02-20 DIAGNOSIS — H26.493: ICD-10-CM

## 2024-02-20 DIAGNOSIS — H10.45: ICD-10-CM

## 2024-02-20 PROCEDURE — 92004 COMPRE OPH EXAM NEW PT 1/>: CPT | Performed by: OPHTHALMOLOGY

## 2024-02-20 ASSESSMENT — REFRACTION_CURRENTRX
OS_CYLINDER: 0.00
OS_OVR_VA: 20/
OD_OVR_VA: 20/
OD_AXIS: 180
OD_VPRISM_DIRECTION: PROGS
OD_ADD: +2.50
OD_CYLINDER: 0.00
OS_AXIS: 100
OD_CYLINDER: 0.00
OD_ADD: +2.50
OS_CYLINDER: -1.00
OS_VPRISM_DIRECTION: PROGS
OS_SPHERE: -3.25
OD_AXIS: 180
OS_VPRISM_DIRECTION: PROGS
OS_ADD: +2.50
OS_AXIS: 180
OD_SPHERE: -0.50
OS_ADD: +2.50
OD_OVR_VA: 20/
OS_OVR_VA: 20/
OS_SPHERE: -3.00
OD_VPRISM_DIRECTION: PROGS
OD_SPHERE: +0.75

## 2024-02-20 ASSESSMENT — REFRACTION_MANIFEST
OD_VA1: 20/20
OD_CYLINDER: SPHERE
OD_VA1: 20/20
OS_SPHERE: -3.00
OS_VA1: 20/20
OS_AXIS: 095
OS_ADD: +2.50
OD_SPHERE: -0.50
OS_VA1: 20/20
OS_SPHERE: -3.00
OD_SPHERE: -0.50
OS_AXIS: 130
OS_VA2: 20/20
OD_ADD: +2.50
OS_CYLINDER: -1.25
OD_CYLINDER: SPHERE
OS_CYLINDER: -1.00
OD_VA2: 20/20
OS_ADD: +2.50
OD_ADD: +2.50

## 2024-02-20 ASSESSMENT — REFRACTION_AUTOREFRACTION
OD_CYLINDER: -0.50
OS_SPHERE: -2.75
OD_AXIS: 089
OS_AXIS: 120
OD_SPHERE: 0.00
OS_CYLINDER: -1.50

## 2024-02-20 ASSESSMENT — LID EXAM ASSESSMENTS
OS_BLEPHARITIS: 1+
OD_BLEPHARITIS: 1+

## 2024-02-20 ASSESSMENT — SPHEQUIV_DERIVED
OS_SPHEQUIV: -3.5
OS_SPHEQUIV: -3.625
OS_SPHEQUIV: -3.5
OD_SPHEQUIV: -0.25

## 2024-02-20 ASSESSMENT — CONFRONTATIONAL VISUAL FIELD TEST (CVF)
OS_FINDINGS: FULL
OD_FINDINGS: FULL

## 2024-03-06 ENCOUNTER — APPOINTMENT (OUTPATIENT)
Dept: ORTHOPEDIC SURGERY | Facility: CLINIC | Age: 69
End: 2024-03-06
Payer: COMMERCIAL

## 2024-03-06 VITALS — HEIGHT: 60 IN | WEIGHT: 165 LBS | BODY MASS INDEX: 32.39 KG/M2

## 2024-03-06 DIAGNOSIS — M17.12 UNILATERAL PRIMARY OSTEOARTHRITIS, LEFT KNEE: ICD-10-CM

## 2024-03-06 PROCEDURE — 99214 OFFICE O/P EST MOD 30 MIN: CPT

## 2024-03-06 NOTE — PHYSICAL EXAM
[Antalgic] : antalgic [] : Motor: [LE] : Sensory: Intact in bilateral lower extremities [ALL] : dorsalis pedis, posterior tibial, femoral, popliteal, and radial 2+ and symmetric bilaterally [Normal] : Alert and in no acute distress [de-identified] :  GENERAL APPEARANCE: Well nourished and hydrated, pleasant, alert, and oriented x 3. Appears their stated age.  HEENT: Normocephalic, extraocular eye motion intact. Nasal septum midline. Oral cavity clear. External auditory canal clear.  RESPIRATORY: Breath sounds clear and audible in all lobes. No wheezing, No accessory muscle use. CARDIOVASCULAR: No apparent abnormalities. No lower leg edema. No varicosities. Pedal pulses are palpable. NEUROLOGIC: Sensation is normal, no muscle weakness in the upper or lower extremities. DERMATOLOGIC: No apparent skin lesions, moist, warm, no rash. SPINE: Cervical spine appears normal and moves freely; thoracic spine appears normal and moves freely; lumbosacral spine appears normal and moves freely, normal, nontender. MUSCULOSKELETAL: Hands, wrists, and elbows are normal and move freely, shoulders are normal and move freely.       [Poor Appearance] : well-appearing [de-identified] : Left knee exam medial joint line tenderness.  Mild effusion.  Range of motion 0/120.

## 2024-03-06 NOTE — REASON FOR VISIT
[Follow-Up Visit] : a follow-up visit for [Other: ____] : [unfilled] [FreeTextEntry2] : s/p Right total knee arthroplasty. Computer-assisted tibial resection, OrthAlign procedure. 10/17/2023.

## 2024-03-06 NOTE — DISCUSSION/SUMMARY
[Medication Risks Reviewed] : Medication risks reviewed [Surgical risks reviewed] : Surgical risks reviewed [de-identified] : 67 y/o F pt is here for bone on bone medial compartmental osteoarthritis of the left knee. The nature of her condition and treatment options were discussed in detail. The pt is a candidate for a leftTKA. The surgery was discussed in detail. The pt has exhausted conservative treatment such as HA injections, cortisone injections, and anti-inflammatories which are decreasingly helpful. The pt has been scheduled for a left TKA fir April 9th, 2024. All questions regarding the surgery were answered.  The pt is s/p right TKA on October 17, 2023. The pt's right knee is doing well. We reassured the pt that her implants are stable with no evidence of loosening or wear. She should continue to do low impact exercises. Pt understands the importance of prophylaxis for invasive dental procedures. F/u with us a year from surgery.		   The patient is a 68 year year old individual with end stage arthritis of their left knee joint. The patient has exhausted a minimum of 3 months conservative treatment including prior injections (cortisone and/or hyaluronic acid injections), physical therapy, over the counter NSAIDS and pain medication where indicated.  In addition, the patient's quality of life is diminished due to significant chronic pain. The patient is having difficulty with activities of daily living, including ambulating, descending stairs, and rising from a seated position. Based upon the patients continued symptoms and failure to respond to conservative treatment, I have recommended a left total knee replacement for this patient. A long discussion took place with the patient describing what a total joint replacement is and what the procedure would entail. A knee model, similar to the implant that will be used during the operation, was utilized to demonstrate and to discuss the various bearing surfaces of the implants. Implant fixation, use of cement, was also discussed with the patient. Choices of implant manufacturers, mainly [employer] and [employer], were discussed and reviewed with preference to be made by patient and surgeon prior to operation. Final selection to be based on customary practice as well as preoperative templating with selection confirmed intraoperatively based on the patient's anatomy. The patient participated and agreed with the decision-making process. The hospitalization and post-operative care and rehabilitation were also discussed. The use of perioperative antibiotics and DVT prophylaxis were discussed. The risk, benefits and alternatives to a surgical intervention were discussed at length with the patient. The patient was also advised of risks related to the medical comorbidities and elevated body mass index (BMI). A lengthy discussion took place to review the most common complications including but not limited to: deep vein thrombosis, pulmonary embolism, heart attack, stroke, infection, wound breakdown, numbness, damage to nerves, tendon, muscles, arteries or other blood vessels, death and other possible complications from anesthesia. The patient was told that we will take steps to minimize these risks by using sterile technique, antibiotics and DVT prophylaxis when appropriate and follow the patient postoperatively in the office setting to monitor progress. The possibility of recurrent pain, no improvement in pain and actual worsening of pain were also discussed with the patient.  The discharge plan of care focused on the patient going home following surgery. The patient was encouraged to make the necessary arrangements to have someone stay with them when they are discharged home. Following discharge, a home care nurse will visit the patient. The home care nurse will open your home care case and request home physical therapy services. Home physical therapy will commence following discharge provided it is appropriate and covered by the health insurance benefit plan.  The benefits of surgery were discussed with the patient including the potential for improving his/her current clinical condition through operative intervention. Alternatives to surgical intervention including continued conservative management were also discussed in detail. All questions were answered to the satisfaction of the patient. The treatment plan of care, as well as a model of a total knee equivalent to the one that will be used for their total joint replacement, was shared with the patient. The patient participated and agreed to the plan of care as well as the use of the recommended implants for their total joint replacement surgery.

## 2024-03-06 NOTE — HISTORY OF PRESENT ILLNESS
[Worsening] : worsening [de-identified] : Patient is a 68-year-old female here for left knee pain.  Patient is status post right total knee arthroplasty October 17, 2023 and reports her right knee is doing very well.  Patient has history of left knee pain for many years progressively worsening over the past year.  She has history of left knee scope.  She has had 2 rounds of Synvisc injections as well as numerous cortisone injections in the left knee in the past.  She has little relief from these injections.  Patient reports she has limitations with walking distances and exercise.  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 The pt has been scheduled for a left TKA fir April 9th, 2024. [5] : a current pain level of 5/10 [2] : a minimum pain level of 2/10 [8] : a maximum pain level of 8/10

## 2024-03-20 ENCOUNTER — OUTPATIENT (OUTPATIENT)
Dept: OUTPATIENT SERVICES | Facility: HOSPITAL | Age: 69
LOS: 1 days | End: 2024-03-20
Payer: COMMERCIAL

## 2024-03-20 VITALS
SYSTOLIC BLOOD PRESSURE: 132 MMHG | RESPIRATION RATE: 20 BRPM | HEIGHT: 60 IN | HEART RATE: 78 BPM | TEMPERATURE: 98 F | WEIGHT: 159.84 LBS | DIASTOLIC BLOOD PRESSURE: 72 MMHG

## 2024-03-20 DIAGNOSIS — Z90.49 ACQUIRED ABSENCE OF OTHER SPECIFIED PARTS OF DIGESTIVE TRACT: Chronic | ICD-10-CM

## 2024-03-20 DIAGNOSIS — Z98.49 CATARACT EXTRACTION STATUS, UNSPECIFIED EYE: Chronic | ICD-10-CM

## 2024-03-20 DIAGNOSIS — Z96.651 PRESENCE OF RIGHT ARTIFICIAL KNEE JOINT: Chronic | ICD-10-CM

## 2024-03-20 DIAGNOSIS — Z98.890 OTHER SPECIFIED POSTPROCEDURAL STATES: Chronic | ICD-10-CM

## 2024-03-20 DIAGNOSIS — Z01.818 ENCOUNTER FOR OTHER PREPROCEDURAL EXAMINATION: ICD-10-CM

## 2024-03-20 LAB
A1C WITH ESTIMATED AVERAGE GLUCOSE RESULT: 5.9 % — HIGH (ref 4–5.6)
ALBUMIN SERPL ELPH-MCNC: 4 G/DL — SIGNIFICANT CHANGE UP (ref 3.3–5.2)
ALP SERPL-CCNC: 96 U/L — SIGNIFICANT CHANGE UP (ref 40–120)
ALT FLD-CCNC: 10 U/L — SIGNIFICANT CHANGE UP
ANION GAP SERPL CALC-SCNC: 12 MMOL/L — SIGNIFICANT CHANGE UP (ref 5–17)
AST SERPL-CCNC: 20 U/L — SIGNIFICANT CHANGE UP
BASOPHILS # BLD AUTO: 0.04 K/UL — SIGNIFICANT CHANGE UP (ref 0–0.2)
BASOPHILS NFR BLD AUTO: 0.9 % — SIGNIFICANT CHANGE UP (ref 0–2)
BILIRUB SERPL-MCNC: <0.2 MG/DL — LOW (ref 0.4–2)
BLD GP AB SCN SERPL QL: SIGNIFICANT CHANGE UP
BUN SERPL-MCNC: 18.5 MG/DL — SIGNIFICANT CHANGE UP (ref 8–20)
CALCIUM SERPL-MCNC: 9.3 MG/DL — SIGNIFICANT CHANGE UP (ref 8.4–10.5)
CHLORIDE SERPL-SCNC: 100 MMOL/L — SIGNIFICANT CHANGE UP (ref 96–108)
CO2 SERPL-SCNC: 26 MMOL/L — SIGNIFICANT CHANGE UP (ref 22–29)
CREAT SERPL-MCNC: 0.74 MG/DL — SIGNIFICANT CHANGE UP (ref 0.5–1.3)
EGFR: 87 ML/MIN/1.73M2 — SIGNIFICANT CHANGE UP
EOSINOPHIL # BLD AUTO: 0.21 K/UL — SIGNIFICANT CHANGE UP (ref 0–0.5)
EOSINOPHIL NFR BLD AUTO: 4.8 % — SIGNIFICANT CHANGE UP (ref 0–6)
ESTIMATED AVERAGE GLUCOSE: 123 MG/DL — HIGH (ref 68–114)
GLUCOSE SERPL-MCNC: 101 MG/DL — HIGH (ref 70–99)
HCT VFR BLD CALC: 33.7 % — LOW (ref 34.5–45)
HGB BLD-MCNC: 11 G/DL — LOW (ref 11.5–15.5)
IMM GRANULOCYTES NFR BLD AUTO: 0.2 % — SIGNIFICANT CHANGE UP (ref 0–0.9)
LYMPHOCYTES # BLD AUTO: 1.26 K/UL — SIGNIFICANT CHANGE UP (ref 1–3.3)
LYMPHOCYTES # BLD AUTO: 28.7 % — SIGNIFICANT CHANGE UP (ref 13–44)
MCHC RBC-ENTMCNC: 28.7 PG — SIGNIFICANT CHANGE UP (ref 27–34)
MCHC RBC-ENTMCNC: 32.6 GM/DL — SIGNIFICANT CHANGE UP (ref 32–36)
MCV RBC AUTO: 88 FL — SIGNIFICANT CHANGE UP (ref 80–100)
MONOCYTES # BLD AUTO: 0.52 K/UL — SIGNIFICANT CHANGE UP (ref 0–0.9)
MONOCYTES NFR BLD AUTO: 11.8 % — SIGNIFICANT CHANGE UP (ref 2–14)
NEUTROPHILS # BLD AUTO: 2.35 K/UL — SIGNIFICANT CHANGE UP (ref 1.8–7.4)
NEUTROPHILS NFR BLD AUTO: 53.6 % — SIGNIFICANT CHANGE UP (ref 43–77)
PLATELET # BLD AUTO: 255 K/UL — SIGNIFICANT CHANGE UP (ref 150–400)
POTASSIUM SERPL-MCNC: 4.7 MMOL/L — SIGNIFICANT CHANGE UP (ref 3.5–5.3)
POTASSIUM SERPL-SCNC: 4.7 MMOL/L — SIGNIFICANT CHANGE UP (ref 3.5–5.3)
PROT SERPL-MCNC: 6.8 G/DL — SIGNIFICANT CHANGE UP (ref 6.6–8.7)
RBC # BLD: 3.83 M/UL — SIGNIFICANT CHANGE UP (ref 3.8–5.2)
RBC # FLD: 14.1 % — SIGNIFICANT CHANGE UP (ref 10.3–14.5)
SODIUM SERPL-SCNC: 138 MMOL/L — SIGNIFICANT CHANGE UP (ref 135–145)
WBC # BLD: 4.39 K/UL — SIGNIFICANT CHANGE UP (ref 3.8–10.5)
WBC # FLD AUTO: 4.39 K/UL — SIGNIFICANT CHANGE UP (ref 3.8–10.5)

## 2024-03-20 PROCEDURE — 93010 ELECTROCARDIOGRAM REPORT: CPT

## 2024-03-20 PROCEDURE — G0463: CPT

## 2024-03-20 PROCEDURE — 93005 ELECTROCARDIOGRAM TRACING: CPT

## 2024-03-20 RX ORDER — IBANDRONATE SODIUM 150 MG/1
1 TABLET ORAL
Refills: 0 | DISCHARGE

## 2024-03-20 NOTE — H&P PST ADULT - ASSESSMENT
69 y/o female with PMH of HTN, HDL, asthma, OA, GERD arrives from home to PST with complaints of rleft knee pain. Upon assessment, +left knee medical tenderness to palpation, surgical site clear and intact. Pt instructed to stop vitamins/supplements/herbal medications/ASA/NSAIDS for one week prior to surgery and discuss with PMD. Patient educated on surgical scrub, preadmission instructions, medical clearance and day of procedure medications, verbalizes understanding. hold losartan DOS    OPIOID RISK TOOL    HAZEL EACH BOX THAT APPLIES AND ADD TOTALS AT THE END    FAMILY HISTORY OF SUBSTANCE ABUSE                 FEMALE         MALE                                                Alcohol                             [  ]1 pt          [  ]3pts                                               Illegal Durgs                     [  ]2 pts        [  ]3pts                                               Rx Drugs                           [  ]4 pts        [  ]4 pts    PERSONAL HISTORY OF SUBSTANCE ABUSE                                                                                          Alcohol                             [  ]3 pts       [  ]3 pts                                               Illegal Durgs                     [  ]4 pts        [  ]4 pts                                               Rx Drugs                           [  ]5 pts        [  ]5 pts    AGE BETWEEN 16-45 YEARS                                      [  ]1 pt         [  ]1 pt    HISTORY OF PREADOLESCENT   SEXUAL ABUSE                                                             [  ]3 pts        [  ]0pts    PSYCHOLOGICAL DISEASE                     ADD, OCD, Bipolar, Schizophrenia        [  ]2 pts         [  ]2 pts                      Depression                                               [  ]1 pt           [  ]1 pt           SCORING TOTAL   (add numbers and type here)              (**0*)                                     A score of 3 or lower indicated LOW risk for future opiod abuse  A score of 4 to 7 indicated moderate risk for future opiod abuse  A score of 8 or higher indicates a high risk for opiod abuse    CAPRINI SCORE    AGE RELATED RISK FACTORS                                                             [ ] Age 41-60 years                                            (1 Point)  [x ] Age: 61-74 years                                           (2 Points)                 [ ] Age= 75 years                                                (3 Points)             DISEASE RELATED RISK FACTORS                                                       [ ] Edema in the lower extremities                 (1 Point)                     x[ ] Varicose veins                                               (1 Point)                                 [ x] BMI > 25 Kg/m2                                            (1 Point)                                  [ ] Serious infection (ie PNA)                            (1 Point)                     [ ] Lung disease ( COPD, Emphysema)            (1 Point)                                                                          [ ] Acute myocardial infarction                         (1 Point)                  [ ] Congestive heart failure (in the previous month)  (1 Point)         [ ] Inflammatory bowel disease                            (1 Point)                  [ ] Central venous access, PICC or Port               (2 points)       (within the last month)                                                                [ ] Stroke (in the previous month)                        (5 Points)    [ ] Previous or present malignancy                       (2 points)                                                                                                                                                         HEMATOLOGY RELATED FACTORS                                                         [ ] Prior episodes of VTE                                     (3 Points)                     [ ] Positive family history for VTE                      (3 Points)                  [ ] Prothrombin 11184 A                                     (3 Points)                     [ ] Factor V Leiden                                                (3 Points)                        [ ] Lupus anticoagulants                                      (3 Points)                                                           [ ] Anticardiolipin antibodies                              (3 Points)                                                       [ ] High homocysteine in the blood                   (3 Points)                                             [ ] Other congenital or acquired thrombophilia      (3 Points)                                                [ ] Heparin induced thrombocytopenia                  (3 Points)                                        MOBILITY RELATED FACTORS  [ ] Bed rest                                                         (1 Point)  [ ] Plaster cast                                                    (2 points)  [ ] Bed bound for more than 72 hours           (2 Points)    GENDER SPECIFIC FACTORS  [ ] Pregnancy or had a baby within the last month   (1 Point)  [ ] Post-partum < 6 weeks                                   (1 Point)  [ ] Hormonal therapy  or oral contraception   (1 Point)  [ ] History of pregnancy complications              (1 point)  [ ] Unexplained or recurrent              (1 Point)    OTHER RISK FACTORS                                           (1 Point)  [ ] BMI >40, smoking, diabetes requiring insulin, chemotherapy  blood transfusions and length of surgery over 2 hours    SURGERY RELATED RISK FACTORS  [ ]  Section within the last month     (1 Point)  [ ] Minor surgery                                                  (1 Point)  [ ] Arthroscopic surgery                                       (2 Points)  [x ] Planned major surgery lasting more            (2 Points)      than 45 minutes     [x ] Elective hip or knee joint replacement       (5 points)       surgery                                                TRAUMA RELATED RISK FACTORS  [ ] Fracture of the hip, pelvis, or leg                       (5 Points)  [ ] Spinal cord injury resulting in paralysis             (5 points)       (in the previous month)    [ ] Paralysis  (less than 1 month)                             (5 Points)  [ ] Multiple Trauma within 1 month                        (5 Points)    Total Score [    11 ]    Caprini Score 0-2: Low Risk, NO VTE prophylaxis required for most patients, encourage ambulation  Caprini Score 3-6: Moderate Risk , pharmacologic VTE prophylaxis is indicated for most patients (in the absence of contraindications)  Caprini Score Greater than or =7: High risk, pharmocologic VTE prophylaxis indicated for most patients (in the absence of contraindications)

## 2024-03-20 NOTE — H&P PST ADULT - NSICDXPASTSURGICALHX_GEN_ALL_CORE_FT
Postpartum Event Note
evaluation
PAST SURGICAL HISTORY:  H/O breast biopsy     H/O cataract extraction     H/O total knee replacement, right     History of appendectomy     S/P left knee arthroscopy

## 2024-03-20 NOTE — H&P PST ADULT - MUSCULOSKELETAL COMMENTS
osteo porosis compression fracture neck + left knee medical tenderness to palpation, surgical site clear and intact.

## 2024-03-20 NOTE — H&P PST ADULT - PROBLEM SELECTOR PLAN 3
Caprini Score 11: High risk, pharmocologic VTE prophylaxis indicated for most patients (in the absence of contraindications   IPCD as per surgical team

## 2024-03-20 NOTE — H&P PST ADULT - HISTORY OF PRESENT ILLNESS
67 y/o female with PMH of HTN, HDL, asthma, OA, GERD arrives from home to PST with complaints left knee pain. States the pain has worsened since 2008. Pain at rest is a 0/10, but with walking can get up to a 8/10. Her pain is achy and relieved by rest. her activity is very limited She denies any numbness, weakness, or tingling in the leg. She uses Tylenol for pain with some relief. She is scheduled for left Knee Total Knee Joint Replacement with MD Mo on 4/9/2024  Medical & Cardiac Clearance Pending Patient educated on surgical scrub, , preadmission instructions, medical clearance and day of procedure medications, verbalizes understanding..

## 2024-03-21 LAB
MRSA PCR RESULT.: SIGNIFICANT CHANGE UP
S AUREUS DNA NOSE QL NAA+PROBE: SIGNIFICANT CHANGE UP

## 2024-03-28 NOTE — PROVIDER CONTACT NOTE (OTHER) - SITUATION
Attended joint education session via virtual method with opportunity to ask questions. Contact information for follow up questions given.

## 2024-04-08 ENCOUNTER — TRANSCRIPTION ENCOUNTER (OUTPATIENT)
Age: 69
End: 2024-04-08

## 2024-04-08 RX ORDER — POVIDONE-IODINE 5 %
1 AEROSOL (ML) TOPICAL ONCE
Refills: 0 | Status: COMPLETED | OUTPATIENT
Start: 2024-04-09 | End: 2024-04-09

## 2024-04-09 ENCOUNTER — TRANSCRIPTION ENCOUNTER (OUTPATIENT)
Age: 69
End: 2024-04-09

## 2024-04-09 ENCOUNTER — APPOINTMENT (OUTPATIENT)
Dept: ORTHOPEDIC SURGERY | Facility: HOSPITAL | Age: 69
End: 2024-04-09

## 2024-04-09 ENCOUNTER — INPATIENT (INPATIENT)
Facility: HOSPITAL | Age: 69
LOS: 0 days | Discharge: HOME HEALTH | DRG: 470 | End: 2024-04-10
Attending: ORTHOPAEDIC SURGERY | Admitting: ORTHOPAEDIC SURGERY
Payer: COMMERCIAL

## 2024-04-09 VITALS
DIASTOLIC BLOOD PRESSURE: 82 MMHG | HEART RATE: 80 BPM | HEIGHT: 60 IN | WEIGHT: 159.84 LBS | TEMPERATURE: 98 F | OXYGEN SATURATION: 100 % | SYSTOLIC BLOOD PRESSURE: 143 MMHG | RESPIRATION RATE: 16 BRPM

## 2024-04-09 DIAGNOSIS — Z98.890 OTHER SPECIFIED POSTPROCEDURAL STATES: Chronic | ICD-10-CM

## 2024-04-09 DIAGNOSIS — Z96.651 PRESENCE OF RIGHT ARTIFICIAL KNEE JOINT: Chronic | ICD-10-CM

## 2024-04-09 DIAGNOSIS — Z90.49 ACQUIRED ABSENCE OF OTHER SPECIFIED PARTS OF DIGESTIVE TRACT: Chronic | ICD-10-CM

## 2024-04-09 DIAGNOSIS — Z98.49 CATARACT EXTRACTION STATUS, UNSPECIFIED EYE: Chronic | ICD-10-CM

## 2024-04-09 DIAGNOSIS — M17.12 UNILATERAL PRIMARY OSTEOARTHRITIS, LEFT KNEE: ICD-10-CM

## 2024-04-09 LAB
APTT BLD: 35.1 SEC — SIGNIFICANT CHANGE UP (ref 24.5–35.6)
INR BLD: 1.06 RATIO — SIGNIFICANT CHANGE UP (ref 0.85–1.18)
PROTHROM AB SERPL-ACNC: 11.7 SEC — SIGNIFICANT CHANGE UP (ref 9.5–13)

## 2024-04-09 PROCEDURE — 20985 CPTR-ASST DIR MS PX: CPT

## 2024-04-09 PROCEDURE — 73560 X-RAY EXAM OF KNEE 1 OR 2: CPT | Mod: 26,LT

## 2024-04-09 PROCEDURE — 27447 TOTAL KNEE ARTHROPLASTY: CPT | Mod: AS,LT

## 2024-04-09 PROCEDURE — 99222 1ST HOSP IP/OBS MODERATE 55: CPT

## 2024-04-09 PROCEDURE — 27447 TOTAL KNEE ARTHROPLASTY: CPT | Mod: LT

## 2024-04-09 DEVICE — CEMENT PALACOS R: Type: IMPLANTABLE DEVICE | Site: LEFT | Status: FUNCTIONAL

## 2024-04-09 DEVICE — COMP PATELLA TRI-PEG E-PLUS POLY 8X32MM: Type: IMPLANTABLE DEVICE | Site: LEFT | Status: FUNCTIONAL

## 2024-04-09 DEVICE — GUIDE PIN SCREW ORTHO THRD SQ HEADED: Type: IMPLANTABLE DEVICE | Site: LEFT | Status: FUNCTIONAL

## 2024-04-09 DEVICE — GUIDE PIN SCREW ORTHO STR FLUTED: Type: IMPLANTABLE DEVICE | Site: LEFT | Status: FUNCTIONAL

## 2024-04-09 DEVICE — COMP FEM NON PORUS SZ 5 LT: Type: IMPLANTABLE DEVICE | Site: LEFT | Status: FUNCTIONAL

## 2024-04-09 DEVICE — PIN ARVIS ALIGNMENT AR SURGICAL GUIDANCE: Type: IMPLANTABLE DEVICE | Site: LEFT | Status: FUNCTIONAL

## 2024-04-09 DEVICE — GUIDE PIN/SCREW ORTHO 3.2 X 37MM: Type: IMPLANTABLE DEVICE | Site: LEFT | Status: FUNCTIONAL

## 2024-04-09 DEVICE — STEM MOD UNIV TIB 12X40MM: Type: IMPLANTABLE DEVICE | Site: LEFT | Status: FUNCTIONAL

## 2024-04-09 DEVICE — IMPLANTABLE DEVICE: Type: IMPLANTABLE DEVICE | Site: LEFT | Status: FUNCTIONAL

## 2024-04-09 DEVICE — INSERT TIB NONPOROUS UNIV SZ 5 LT: Type: IMPLANTABLE DEVICE | Site: LEFT | Status: FUNCTIONAL

## 2024-04-09 RX ORDER — POLYETHYLENE GLYCOL 3350 17 G/17G
17 POWDER, FOR SOLUTION ORAL AT BEDTIME
Refills: 0 | Status: DISCONTINUED | OUTPATIENT
Start: 2024-04-09 | End: 2024-04-10

## 2024-04-09 RX ORDER — BUDESONIDE AND FORMOTEROL FUMARATE DIHYDRATE 160; 4.5 UG/1; UG/1
2 AEROSOL RESPIRATORY (INHALATION)
Refills: 0 | Status: DISCONTINUED | OUTPATIENT
Start: 2024-04-09 | End: 2024-04-10

## 2024-04-09 RX ORDER — FERROUS SULFATE 325(65) MG
325 TABLET ORAL DAILY
Refills: 0 | Status: DISCONTINUED | OUTPATIENT
Start: 2024-04-09 | End: 2024-04-10

## 2024-04-09 RX ORDER — PANTOPRAZOLE SODIUM 20 MG/1
40 TABLET, DELAYED RELEASE ORAL ONCE
Refills: 0 | Status: COMPLETED | OUTPATIENT
Start: 2024-04-09 | End: 2024-04-09

## 2024-04-09 RX ORDER — SODIUM CHLORIDE 9 MG/ML
1000 INJECTION INTRAMUSCULAR; INTRAVENOUS; SUBCUTANEOUS
Refills: 0 | Status: DISCONTINUED | OUTPATIENT
Start: 2024-04-09 | End: 2024-04-10

## 2024-04-09 RX ORDER — CEFAZOLIN SODIUM 1 G
2000 VIAL (EA) INJECTION
Refills: 0 | Status: COMPLETED | OUTPATIENT
Start: 2024-04-09 | End: 2024-04-09

## 2024-04-09 RX ORDER — OXYCODONE HYDROCHLORIDE 5 MG/1
10 TABLET ORAL
Refills: 0 | Status: DISCONTINUED | OUTPATIENT
Start: 2024-04-09 | End: 2024-04-10

## 2024-04-09 RX ORDER — BUDESONIDE AND FORMOTEROL FUMARATE DIHYDRATE 160; 4.5 UG/1; UG/1
2 AEROSOL RESPIRATORY (INHALATION)
Refills: 0 | DISCHARGE

## 2024-04-09 RX ORDER — KETOROLAC TROMETHAMINE 30 MG/ML
15 SYRINGE (ML) INJECTION EVERY 6 HOURS
Refills: 0 | Status: DISCONTINUED | OUTPATIENT
Start: 2024-04-09 | End: 2024-04-10

## 2024-04-09 RX ORDER — HYDROMORPHONE HYDROCHLORIDE 2 MG/ML
4 INJECTION INTRAMUSCULAR; INTRAVENOUS; SUBCUTANEOUS EVERY 4 HOURS
Refills: 0 | Status: DISCONTINUED | OUTPATIENT
Start: 2024-04-09 | End: 2024-04-10

## 2024-04-09 RX ORDER — SODIUM CHLORIDE 9 MG/ML
1000 INJECTION, SOLUTION INTRAVENOUS
Refills: 0 | Status: DISCONTINUED | OUTPATIENT
Start: 2024-04-09 | End: 2024-04-09

## 2024-04-09 RX ORDER — ONDANSETRON 8 MG/1
4 TABLET, FILM COATED ORAL EVERY 6 HOURS
Refills: 0 | Status: DISCONTINUED | OUTPATIENT
Start: 2024-04-09 | End: 2024-04-10

## 2024-04-09 RX ORDER — EZETIMIBE 10 MG/1
1 TABLET ORAL
Refills: 0 | DISCHARGE

## 2024-04-09 RX ORDER — CEFAZOLIN SODIUM 1 G
2000 VIAL (EA) INJECTION ONCE
Refills: 0 | Status: DISCONTINUED | OUTPATIENT
Start: 2024-04-09 | End: 2024-04-09

## 2024-04-09 RX ORDER — FENTANYL CITRATE 50 UG/ML
25 INJECTION INTRAVENOUS
Refills: 0 | Status: DISCONTINUED | OUTPATIENT
Start: 2024-04-09 | End: 2024-04-09

## 2024-04-09 RX ORDER — ALBUTEROL 90 UG/1
2 AEROSOL, METERED ORAL EVERY 6 HOURS
Refills: 0 | Status: DISCONTINUED | OUTPATIENT
Start: 2024-04-09 | End: 2024-04-10

## 2024-04-09 RX ORDER — MONTELUKAST 4 MG/1
10 TABLET, CHEWABLE ORAL DAILY
Refills: 0 | Status: DISCONTINUED | OUTPATIENT
Start: 2024-04-09 | End: 2024-04-10

## 2024-04-09 RX ORDER — ACETAMINOPHEN 500 MG
975 TABLET ORAL ONCE
Refills: 0 | Status: COMPLETED | OUTPATIENT
Start: 2024-04-09 | End: 2024-04-09

## 2024-04-09 RX ORDER — SENNA PLUS 8.6 MG/1
2 TABLET ORAL AT BEDTIME
Refills: 0 | Status: DISCONTINUED | OUTPATIENT
Start: 2024-04-09 | End: 2024-04-10

## 2024-04-09 RX ORDER — TRANEXAMIC ACID 100 MG/ML
1000 INJECTION, SOLUTION INTRAVENOUS ONCE
Refills: 0 | Status: DISCONTINUED | OUTPATIENT
Start: 2024-04-09 | End: 2024-04-09

## 2024-04-09 RX ORDER — PANTOPRAZOLE SODIUM 20 MG/1
40 TABLET, DELAYED RELEASE ORAL
Refills: 0 | Status: DISCONTINUED | OUTPATIENT
Start: 2024-04-09 | End: 2024-04-10

## 2024-04-09 RX ORDER — ALBUTEROL 90 UG/1
2 AEROSOL, METERED ORAL
Refills: 0 | DISCHARGE

## 2024-04-09 RX ORDER — MAGNESIUM HYDROXIDE 400 MG/1
30 TABLET, CHEWABLE ORAL DAILY
Refills: 0 | Status: DISCONTINUED | OUTPATIENT
Start: 2024-04-09 | End: 2024-04-10

## 2024-04-09 RX ORDER — SODIUM CHLORIDE 9 MG/ML
3 INJECTION INTRAMUSCULAR; INTRAVENOUS; SUBCUTANEOUS EVERY 8 HOURS
Refills: 0 | Status: DISCONTINUED | OUTPATIENT
Start: 2024-04-09 | End: 2024-04-09

## 2024-04-09 RX ORDER — FERROUS GLUCONATE 100 %
1 POWDER (GRAM) MISCELLANEOUS
Refills: 0 | DISCHARGE

## 2024-04-09 RX ORDER — LOSARTAN POTASSIUM 100 MG/1
50 TABLET, FILM COATED ORAL DAILY
Refills: 0 | Status: DISCONTINUED | OUTPATIENT
Start: 2024-04-11 | End: 2024-04-10

## 2024-04-09 RX ORDER — MONTELUKAST 4 MG/1
1 TABLET, CHEWABLE ORAL
Refills: 0 | DISCHARGE

## 2024-04-09 RX ORDER — FLUTICASONE PROPIONATE 50 MCG
2 SPRAY, SUSPENSION NASAL
Refills: 0 | Status: DISCONTINUED | OUTPATIENT
Start: 2024-04-09 | End: 2024-04-10

## 2024-04-09 RX ORDER — APREPITANT 80 MG/1
40 CAPSULE ORAL ONCE
Refills: 0 | Status: COMPLETED | OUTPATIENT
Start: 2024-04-09 | End: 2024-04-09

## 2024-04-09 RX ORDER — OXYCODONE HYDROCHLORIDE 5 MG/1
5 TABLET ORAL
Refills: 0 | Status: DISCONTINUED | OUTPATIENT
Start: 2024-04-09 | End: 2024-04-10

## 2024-04-09 RX ORDER — SODIUM CHLORIDE 9 MG/ML
500 INJECTION INTRAMUSCULAR; INTRAVENOUS; SUBCUTANEOUS ONCE
Refills: 0 | Status: COMPLETED | OUTPATIENT
Start: 2024-04-09 | End: 2024-04-09

## 2024-04-09 RX ORDER — ASPIRIN/CALCIUM CARB/MAGNESIUM 324 MG
81 TABLET ORAL
Refills: 0 | Status: DISCONTINUED | OUTPATIENT
Start: 2024-04-09 | End: 2024-04-10

## 2024-04-09 RX ORDER — LOSARTAN POTASSIUM 100 MG/1
1 TABLET, FILM COATED ORAL
Refills: 0 | DISCHARGE

## 2024-04-09 RX ORDER — ACETAMINOPHEN 500 MG
975 TABLET ORAL EVERY 8 HOURS
Refills: 0 | Status: DISCONTINUED | OUTPATIENT
Start: 2024-04-09 | End: 2024-04-10

## 2024-04-09 RX ORDER — FLUTICASONE PROPIONATE 50 MCG
2 SPRAY, SUSPENSION NASAL
Refills: 0 | DISCHARGE

## 2024-04-09 RX ORDER — ACETAMINOPHEN 500 MG
1000 TABLET ORAL ONCE
Refills: 0 | Status: COMPLETED | OUTPATIENT
Start: 2024-04-09 | End: 2024-04-10

## 2024-04-09 RX ORDER — ONDANSETRON 8 MG/1
4 TABLET, FILM COATED ORAL ONCE
Refills: 0 | Status: DISCONTINUED | OUTPATIENT
Start: 2024-04-09 | End: 2024-04-09

## 2024-04-09 RX ORDER — ROMOSOZUMAB-AQQG 105 MG/1.17ML
210 INJECTION, SOLUTION SUBCUTANEOUS
Refills: 0 | DISCHARGE

## 2024-04-09 RX ADMIN — POLYETHYLENE GLYCOL 3350 17 GRAM(S): 17 POWDER, FOR SOLUTION ORAL at 21:40

## 2024-04-09 RX ADMIN — SENNA PLUS 2 TABLET(S): 8.6 TABLET ORAL at 21:40

## 2024-04-09 RX ADMIN — SODIUM CHLORIDE 500 MILLILITER(S): 9 INJECTION INTRAMUSCULAR; INTRAVENOUS; SUBCUTANEOUS at 10:40

## 2024-04-09 RX ADMIN — SODIUM CHLORIDE 100 MILLILITER(S): 9 INJECTION INTRAMUSCULAR; INTRAVENOUS; SUBCUTANEOUS at 21:40

## 2024-04-09 RX ADMIN — BUDESONIDE AND FORMOTEROL FUMARATE DIHYDRATE 2 PUFF(S): 160; 4.5 AEROSOL RESPIRATORY (INHALATION) at 20:45

## 2024-04-09 RX ADMIN — Medication 1 TABLET(S): at 13:26

## 2024-04-09 RX ADMIN — Medication 15 MILLIGRAM(S): at 18:28

## 2024-04-09 RX ADMIN — Medication 2000 MILLIGRAM(S): at 15:39

## 2024-04-09 RX ADMIN — PANTOPRAZOLE SODIUM 40 MILLIGRAM(S): 20 TABLET, DELAYED RELEASE ORAL at 13:27

## 2024-04-09 RX ADMIN — OXYCODONE HYDROCHLORIDE 10 MILLIGRAM(S): 5 TABLET ORAL at 18:28

## 2024-04-09 RX ADMIN — APREPITANT 40 MILLIGRAM(S): 80 CAPSULE ORAL at 06:14

## 2024-04-09 RX ADMIN — Medication 1 APPLICATION(S): at 06:23

## 2024-04-09 RX ADMIN — Medication 15 MILLIGRAM(S): at 13:27

## 2024-04-09 RX ADMIN — Medication 15 MILLIGRAM(S): at 23:35

## 2024-04-09 RX ADMIN — Medication 81 MILLIGRAM(S): at 18:28

## 2024-04-09 RX ADMIN — Medication 325 MILLIGRAM(S): at 13:26

## 2024-04-09 RX ADMIN — OXYCODONE HYDROCHLORIDE 5 MILLIGRAM(S): 5 TABLET ORAL at 13:28

## 2024-04-09 RX ADMIN — Medication 975 MILLIGRAM(S): at 13:27

## 2024-04-09 RX ADMIN — ONDANSETRON 4 MILLIGRAM(S): 8 TABLET, FILM COATED ORAL at 13:28

## 2024-04-09 RX ADMIN — OXYCODONE HYDROCHLORIDE 10 MILLIGRAM(S): 5 TABLET ORAL at 23:53

## 2024-04-09 RX ADMIN — Medication 2000 MILLIGRAM(S): at 23:35

## 2024-04-09 RX ADMIN — Medication 975 MILLIGRAM(S): at 06:15

## 2024-04-09 RX ADMIN — Medication 975 MILLIGRAM(S): at 21:40

## 2024-04-09 NOTE — DISCHARGE NOTE PROVIDER - NSDCFUADDINST_GEN_ALL_CORE_FT
The patient will be seen in the office between 2-3 weeks for wound check.   **Your first post-operative visit has been scheduled prior to your admission. PLEASE CONTACT OFFICE TO CONFIRM THE APPOINTMENT DATE. Sutures/Staples/Tape will be removed at that time.  **  The silver based dressing is to be removed 7 days from the date of surgery.   ** CONTACT THE OFFICE IF THE FOLLOWING DEVELOP:  - the dressing becomes soiled or saturated  - you develop a fever greater that 101F  - the wound becomes red or you develop blistering around the wound  * Patient may shower after post-op day #3.   * The patient will continue home PT consistent with  total knee replacement protocol. Transition to outpatient PT will occur at the time of the first office visit.   * The patient will practice knee extension exercises regularly to minimize hamstring contraction.   * The patient is FULL weight bearing.  * The patient will continue ASPIRIN 81mg twice daily for 6 weeks after surgery for blood clot prevention.  * While on aspirin, the patient will take daily omeprazole or other similar medication to protect the stomach from irritation.   * The patient will take OXYCODONE for pain control and adjust according to prescription and patient needs. AND continue TYLENOL 975mg every 8 hours for pain. Contact the office if pain increases while taking prescribed pain medications or related concerns develop.  * The patient will take Senna S while taking oxycodone to prevent narcotic associated constipation.  Additionally, increase water intake (drink at least 8 glasses of water daily) and try adding fiber to the diet by eating fruits, vegetables and foods that are rich in grains. If constipation is experienced, contact the medical/primary care provider to discuss further treatment options.  * To avoid injury at home:  - continue use of rolling walker until cleared by physical therapist  - have family or friend remove all throw rug or objects in hallways that may present a trip hazard.  - if you experience any dizziness or medical concerns, call your medical doctor or  911.  * The implant may activate metal detection devices.

## 2024-04-09 NOTE — CONSULT NOTE ADULT - ASSESSMENT
67 y/o female with PMH of HTN, HLD, asthma (never hospitalized or intubated), OA, GERD presents with complaints left knee pain, worsened since 2008. Pain at rest was a 0/10, but with walking could get up to a 8/10. Her pain was achy and relieved by rest. Her activity was very limited. Patient used Tylenol for pain with some relief.  Patient now s/p Left TKA POD #0.    Left knee OA  S/p Left TKA POD #0  Pain control.  PT/OT.  Antibiotics, wound care and DVT prophylaxis as per Ortho.  Incentive spirometry.  Avoid opioid induced constipation.    HTN  Resume Losartan on POD#2.    HLD  Continue Zetia.    Asthma  Continue Symbicort and Singulair.    Albuterol prn.    GERD  Continue PPI.    Prediabetes  A1c 5.9.  Lifestyle modifications recommended.    DVT prophylaxis   ASA BID as per Ortho.

## 2024-04-09 NOTE — CONSULT NOTE ADULT - NS ATTEND AMEND GEN_ALL_CORE FT
Patient is see and evaluated, chart reviewed in detail, agree with assessment and plan of the NP  patient's pain is well controlled, has no complains  CTA b/l   left Knee Joint area is covered with clean dressing, no bleeding or soaking  Pain meds   monitor vitals  IV fluids.

## 2024-04-09 NOTE — PHYSICAL THERAPY INITIAL EVALUATION ADULT - ADDITIONAL COMMENTS
Pt reports living with spouse in a townhouse with 1 [address] c no rail, and can have all needs met on 1st floor. Pt amb without device and is independent with functional mobility, ADLs, and IADLs. Pt drives and is currently working. Pt has support of family. Pt owns RW and SAC.

## 2024-04-09 NOTE — CONSULT NOTE ADULT - SUBJECTIVE AND OBJECTIVE BOX
CC: Left knee pain    HPI:  69 y/o female with PMH of HTN, HLD, asthma (never hospitalized or intubated), OA, GERD presents with complaints left knee pain, worsened since 2008. Pain at rest was a 0/10, but with walking could get up to a 8/10. Her pain was achy and relieved by rest. Her activity was very limited. Patient used Tylenol for pain with some relief.  Patient now s/p Left TKA POD #0.  Patient seen and examined in PACU.  Pain controlled.       PAST MEDICAL & SURGICAL HISTORY:  HTN (hypertension)  Asthma  OA (osteoarthritis)  High cholesterol  Compression of thoracic vertebra  H/O dermatomyositis  GERD (gastroesophageal reflux disease)  History of appendectomy  H/O cataract extraction  S/P left knee arthroscopy  H/O total knee replacement, right  H/O breast biopsy    FAMILY HISTORY:  Family history of early CAD/CMP (Father)  FH: Heart disease/PPM (Sibling)  FH: breast cancer (Mother)  FH: type 2 diabetes (Sibling)    SOCIAL HISTORY:  Tobacco - Denies  ETOH - Denies  Drug use - Denies    ALLERGIES:  Latex - Rash  Augmentin - Diarrhea  NSAIDs - Gastritis    HOME MEDICATIONS:  Albuterol (Eqv-Proventil HFA) 90 mcg/inh inhalation aerosol: 2 puff(s) inhaled 4 times a day as needed for  bronchospasm (09 Apr 2024 06:04)  Evenity 105 mg/1.17 mL subcutaneous solution: 210 milligram(s) subcutaneously once a month (09 Apr 2024 06:04)  ferrous gluconate 325 mg (36 mg elemental iron) oral tablet: orally once a day (09 Apr 2024 06:04)  Flonase 50 mcg/inh nasal spray: 2 spray(s) in each nostril once a day (09 Apr 2024 06:04)  losartan 50 mg oral tablet: 1 tab(s) orally once a day (09 Apr 2024 06:04)  montelukast 10 mg oral tablet: 1 tab(s) orally once a day (09 Apr 2024 06:04)  Multiple Vitamins oral tablet: 1 tab(s) orally once a day (09 Apr 2024 06:04)  Symbicort 160 mcg-4.5 mcg/inh inhalation aerosol: 2 puff(s) inhaled 2 times a day (09 Apr 2024 06:04)  tacrolimus ointment BID for past 4 weeks:  (09 Apr 2024 06:04)  Tylenol 500 mg oral tablet: 2 tab(s) orally every 8 hours as needed for  mild pain (09 Apr 2024 06:04)  vitamin d 1000 IU 1 tab daily:  (09 Apr 2024 06:04)  Zetia 10 mg oral tablet: 1 tab(s) orally once a day (at bedtime) (09 Apr 2024 06:04)  Prevacid daily    MEDICATIONS  (STANDING):  ceFAZolin  Injectable. 2000 milliGRAM(s) IV Push <User Schedule>  lactated ringers. 1000 milliLiter(s) (75 mL/Hr) IV Continuous <Continuous>    MEDICATIONS  (PRN):  fentaNYL    Injectable 25 MICROGram(s) IV Push every 5 minutes PRN Moderate Pain (4 - 6)  ondansetron Injectable 4 milliGRAM(s) IV Push once PRN Nausea and/or Vomiting      REVIEW OF SYSTEMS:  CONSTITUTIONAL: No fever, weight loss, or fatigue  EYES: No eye pain, visual disturbances, or discharge  NECK: No pain or stiffness  RESPIRATORY: No cough, wheezing, or chills; No shortness of breath  CARDIOVASCULAR: No chest pain, palpitations, dizziness, or leg swelling  GASTROINTESTINAL: No abdominal or epigastric pain. No nausea or vomiting; No diarrhea or constipation.   GENITOURINARY: No dysuria, frequency, hematuria, or incontinence  NEUROLOGICAL: No headaches, memory loss, loss of strength, numbness, or tremors  SKIN: No itching, burning, rashes, or lesions   MUSCULOSKELETAL: Left knee pain  PSYCHIATRIC: No depression, anxiety, mood swings, or difficulty sleeping      Vital Signs Last 24 Hrs  T(C): 37.1 (09 Apr 2024 10:22), Max: 37.1 (09 Apr 2024 10:22)  T(F): 98.7 (09 Apr 2024 10:22), Max: 98.7 (09 Apr 2024 10:22)  HR: 76 (09 Apr 2024 10:45) (68 - 80)  BP: 123/67 (09 Apr 2024 10:45) (109/66 - 143/82)  BP(mean): --  RR: 18 (09 Apr 2024 10:45) (14 - 18)  SpO2: 100% (09 Apr 2024 10:45) (98% - 100%)    Parameters below as of 09 Apr 2024 10:22  Patient On (Oxygen Delivery Method): room air      PHYSICAL EXAM:  GENERAL: NAD  HEAD:  Atraumatic, Normocephalic  EYES: conjunctiva and sclera clear  NECK: Supple, No JVD, Normal thyroid  NERVOUS SYSTEM:  Alert & Oriented X3, Good concentration; Motor Strength 5/5 B/L upper and lower extremities  CHEST/LUNG: Clear to auscultation bilaterally  HEART: Regular rate and rhythm; No murmurs, rubs, or gallops  ABDOMEN: Soft, Nontender, Nondistended; Bowel sounds present  EXTREMITIES:  2+ Peripheral Pulses, Left knee with clean dressing  SKIN: No rashes or lesions      LABS:  PT/INR - ( 09 Apr 2024 06:10 )   PT: 11.7 sec;   INR: 1.06 ratio     PTT - ( 09 Apr 2024 06:10 )  PTT:35.1 sec         CC: Left knee pain    HPI:  69 y/o female with PMH of HTN, HLD, asthma (never hospitalized or intubated), OA, GERD presents with complaints left knee pain, worsened since 2008. Pain at rest was a 0/10, but with walking could get up to a 8/10. Her pain was achy and relieved by rest. Her activity was very limited. Patient used Tylenol for pain with some relief.  Patient now s/p Left TKA POD #0.  Patient seen and examined in PACU.  Pain controlled.       PAST MEDICAL & SURGICAL HISTORY:  HTN (hypertension)  Asthma  OA (osteoarthritis)  High cholesterol  Compression of thoracic vertebra  H/O dermatomyositis  GERD (gastroesophageal reflux disease)  History of appendectomy  H/O cataract extraction  S/P left knee arthroscopy  H/O total knee replacement, right  H/O breast biopsy    FAMILY HISTORY:  Family history of early CAD/CMP (Father)  FH: Heart disease/PPM (Sibling)  FH: breast cancer (Mother)  FH: type 2 diabetes (Sibling)    SOCIAL HISTORY:  Tobacco - Denies  ETOH - Denies  Drug use - Denies    ALLERGIES:  Latex - Rash  Augmentin - Diarrhea  NSAIDs - Gastritis    HOME MEDICATIONS:  Albuterol (Eqv-Proventil HFA) 90 mcg/inh inhalation aerosol: 2 puff(s) inhaled 4 times a day as needed for  bronchospasm (09 Apr 2024 06:04)  Evenity 105 mg/1.17 mL subcutaneous solution: 210 milligram(s) subcutaneously once a month (09 Apr 2024 06:04)  ferrous gluconate 325 mg (36 mg elemental iron) oral tablet: orally once a day (09 Apr 2024 06:04)  Flonase 50 mcg/inh nasal spray: 2 spray(s) in each nostril once a day (09 Apr 2024 06:04)  losartan 50 mg oral tablet: 1 tab(s) orally once a day (09 Apr 2024 06:04)  montelukast 10 mg oral tablet: 1 tab(s) orally once a day (09 Apr 2024 06:04)  Multiple Vitamins oral tablet: 1 tab(s) orally once a day (09 Apr 2024 06:04)  Symbicort 160 mcg-4.5 mcg/inh inhalation aerosol: 2 puff(s) inhaled 2 times a day (09 Apr 2024 06:04)  tacrolimus ointment BID for past 4 weeks:  (09 Apr 2024 06:04)  Tylenol 500 mg oral tablet: 2 tab(s) orally every 8 hours as needed for  mild pain (09 Apr 2024 06:04)  vitamin d 1000 IU 1 tab daily:  (09 Apr 2024 06:04)  Zetia 10 mg oral tablet: 1 tab(s) orally once a day (at bedtime) (09 Apr 2024 06:04)  Prevacid daily    MEDICATIONS  (STANDING):  ceFAZolin  Injectable. 2000 milliGRAM(s) IV Push <User Schedule>  lactated ringers. 1000 milliLiter(s) (75 mL/Hr) IV Continuous <Continuous>    MEDICATIONS  (PRN):  fentaNYL    Injectable 25 MICROGram(s) IV Push every 5 minutes PRN Moderate Pain (4 - 6)  ondansetron Injectable 4 milliGRAM(s) IV Push once PRN Nausea and/or Vomiting        Vital Signs Last 24 Hrs  T(C): 37.1 (09 Apr 2024 10:22), Max: 37.1 (09 Apr 2024 10:22)  T(F): 98.7 (09 Apr 2024 10:22), Max: 98.7 (09 Apr 2024 10:22)  HR: 76 (09 Apr 2024 10:45) (68 - 80)  BP: 123/67 (09 Apr 2024 10:45) (109/66 - 143/82)  BP(mean): --  RR: 18 (09 Apr 2024 10:45) (14 - 18)  SpO2: 100% (09 Apr 2024 10:45) (98% - 100%)    Parameters below as of 09 Apr 2024 10:22  Patient On (Oxygen Delivery Method): room air      PHYSICAL EXAM:  GENERAL: NAD  HEAD:  Atraumatic, Normocephalic  EYES: conjunctiva and sclera clear  NECK: Supple, No JVD, Normal thyroid  NERVOUS SYSTEM:  Alert & Oriented X3  CHEST/LUNG: Clear to auscultation bilaterally  HEART: Regular rate and rhythm; No murmurs  ABDOMEN: Soft, Nontender, Nondistended; Bowel sounds present  EXTREMITIES:  2+ Peripheral Pulses, Left knee with clean dressing  SKIN: No rashes or lesions      LABS:  PT/INR - ( 09 Apr 2024 06:10 )   PT: 11.7 sec;   INR: 1.06 ratio     PTT - ( 09 Apr 2024 06:10 )  PTT:35.1 sec

## 2024-04-09 NOTE — DISCHARGE NOTE PROVIDER - HOSPITAL COURSE
The patient underwent a left TOTAL KNEE REPLACEMENT on 4/9/24. The patient received antibiotics consistent with SCIP guidelines. The patient underwent the procedure and had no intra-operative complications. Post-operatively, the patient was seen by medicine and PT. The patient received Aspirin for DVTP. The patient received pain medications per orthopedic pain management protocol and the pain was appropriately controlled. The patient did not have any post-operative medical complications. The patient was discharged in stable condition.

## 2024-04-09 NOTE — DISCHARGE NOTE NURSING/CASE MANAGEMENT/SOCIAL WORK - PATIENT PORTAL LINK FT
You can access the FollowMyHealth Patient Portal offered by [facility] by registering at the following website: [email or web address]. By joining [email or web address]’s FollowMyHealth portal, you will also be able to view your health information using other applications (apps) compatible with our system. Principal Discharge DX:	Term birth of female   Secondary Diagnosis:	Need for observation and evaluation of  for sepsis

## 2024-04-09 NOTE — PHYSICAL THERAPY INITIAL EVALUATION ADULT - RANGE OF MOTION EXAMINATION, REHAB EVAL
except left knee 0 to 95 deg/bilateral upper extremity ROM was WFL (within functional limits)/bilateral lower extremity ROM was WFL (within functional limits)

## 2024-04-09 NOTE — DISCHARGE NOTE PROVIDER - CARE PROVIDER_API CALL
Kevin Mo  Orthopaedic Surgery  [address] [place] [id number]  Phone: [phone]  Fax: [phone]  Follow Up Time:

## 2024-04-09 NOTE — DISCHARGE NOTE NURSING/CASE MANAGEMENT/SOCIAL WORK - NSDCPEFALRISK_GEN_ALL_CORE
For information on Fall & Injury Prevention, visit: [email or web address] OR  [email or web address] OR  https://www.cdc.gov/steadi/patient.html

## 2024-04-09 NOTE — DISCHARGE NOTE PROVIDER - NSDCFUSCHEDAPPT_GEN_ALL_CORE_FT
Elebiary, Yeon J  [facility]  ORTHOSURG [address]  Scheduled Appointment: 04/30/2024    Kevin Mo  [facility]  ORTHOSURG [address]  Scheduled Appointment: 06/12/2024

## 2024-04-09 NOTE — PATIENT PROFILE ADULT - FALL HARM RISK - RISK INTERVENTIONS
Assistance OOB with selected safe patient handling equipment/Assistance with ambulation/Communicate Fall Risk and Risk Factors to all staff, patient, and family/Discuss with provider need for PT consult/Monitor gait and stability/Provide patient with walking aids - walker, cane, crutches/Reinforce activity limits and safety measures with patient and family/Sit up slowly, dangle for a short time, stand at bedside before walking/Use of alarms - bed, chair and/or voice tab/Visual Cue: Yellow wristband/Bed in lowest position, wheels locked, appropriate side rails in place/Call bell, personal items and telephone in reach/Instruct patient to call for assistance before getting out of bed or chair/Non-slip footwear when patient is out of bed/[place] to call system/Physically safe environment - no spills, clutter or unnecessary equipment/Purposeful Proactive Rounding/Room/bathroom lighting operational, light cord in reach

## 2024-04-09 NOTE — ASU PREOP CHECKLIST - BMI (KG/M2)
31.2
52 yo F with no significant PMHx who presents with abscess to vulva x 4 days. Had small amount of purulent dc and pain to area but no dysuria, hematuria, other rash. No f, ch, n, v, abd pain. Not sexually active. No hx of STD.

## 2024-04-09 NOTE — DISCHARGE NOTE PROVIDER - NSDCMRMEDTOKEN_GEN_ALL_CORE_FT
Albuterol (Eqv-Proventil HFA) 90 mcg/inh inhalation aerosol: 2 puff(s) inhaled 4 times a day as needed for  bronchospasm  Evenity 105 mg/1.17 mL subcutaneous solution: 210 milligram(s) subcutaneously once a month  ferrous gluconate 325 mg (36 mg elemental iron) oral tablet: orally once a day  Flonase 50 mcg/inh nasal spray: 2 spray(s) in each nostril once a day  losartan 50 mg oral tablet: 1 tab(s) orally once a day  montelukast 10 mg oral tablet: 1 tab(s) orally once a day  Multiple Vitamins oral tablet: 1 tab(s) orally once a day  Symbicort 160 mcg-4.5 mcg/inh inhalation aerosol: 2 puff(s) inhaled 2 times a day  tacrolimus ointment BID for past 4 weeks:   Tylenol 500 mg oral tablet: 2 tab(s) orally every 8 hours as needed for  mild pain  vitamin d 1000 IU 1 tab daily:   Zetia 10 mg oral tablet: 1 tab(s) orally once a day (at bedtime)   acetaminophen 325 mg oral tablet: 3 tab(s) orally every 8 hours  Albuterol (Eqv-Proventil HFA) 90 mcg/inh inhalation aerosol: 2 puff(s) inhaled 4 times a day as needed for  bronchospasm  aspirin 81 mg oral delayed release tablet: 1 tab(s) orally 2 times a day  Evenity 105 mg/1.17 mL subcutaneous solution: 210 milligram(s) subcutaneously once a month  ferrous gluconate 325 mg (36 mg elemental iron) oral tablet: orally once a day  Flonase 50 mcg/inh nasal spray: 2 spray(s) in each nostril once a day  losartan 50 mg oral tablet: 1 tab(s) orally once a day  montelukast 10 mg oral tablet: 1 tab(s) orally once a day  Multiple Vitamins oral tablet: 1 tab(s) orally once a day  omeprazole 20 mg oral delayed release tablet: 1 tab(s) orally once a day  oxyCODONE 5 mg oral tablet: 1 tab(s) orally every 6 hours as needed for Mild Pain (1 - 3) MDD: 4 tabs  senna leaf extract oral tablet: 2 tab(s) orally once a day (at bedtime)  Symbicort 160 mcg-4.5 mcg/inh inhalation aerosol: 2 puff(s) inhaled 2 times a day  tacrolimus ointment BID for past 4 weeks:   vitamin d 1000 IU 1 tab daily:   Zetia 10 mg oral tablet: 1 tab(s) orally once a day (at bedtime)   acetaminophen 325 mg oral tablet: 3 tab(s) orally every 8 hours  Albuterol (Eqv-Proventil HFA) 90 mcg/inh inhalation aerosol: 2 puff(s) inhaled 4 times a day as needed for  bronchospasm  aspirin 81 mg oral delayed release tablet: 1 tab(s) orally 2 times a day  celecoxib 200 mg oral capsule: 1 cap(s) orally 2 times a day  Evenity 105 mg/1.17 mL subcutaneous solution: 210 milligram(s) subcutaneously once a month  ferrous gluconate 325 mg (36 mg elemental iron) oral tablet: orally once a day  Flonase 50 mcg/inh nasal spray: 2 spray(s) in each nostril once a day  losartan 50 mg oral tablet: 1 tab(s) orally once a day  montelukast 10 mg oral tablet: 1 tab(s) orally once a day  Multiple Vitamins oral tablet: 1 tab(s) orally once a day  omeprazole 20 mg oral delayed release tablet: 1 tab(s) orally once a day  oxyCODONE 5 mg oral tablet: 1 tab(s) orally every 6 hours as needed for Mild Pain (1 - 3) MDD: 4 tabs  senna leaf extract oral tablet: 2 tab(s) orally once a day (at bedtime)  Symbicort 160 mcg-4.5 mcg/inh inhalation aerosol: 2 puff(s) inhaled 2 times a day  tacrolimus ointment BID for past 4 weeks:   vitamin d 1000 IU 1 tab daily:   Zetia 10 mg oral tablet: 1 tab(s) orally once a day (at bedtime)

## 2024-04-10 VITALS
RESPIRATION RATE: 18 BRPM | SYSTOLIC BLOOD PRESSURE: 102 MMHG | HEART RATE: 65 BPM | OXYGEN SATURATION: 99 % | DIASTOLIC BLOOD PRESSURE: 61 MMHG | TEMPERATURE: 97 F

## 2024-04-10 DIAGNOSIS — R11.0 NAUSEA: ICD-10-CM

## 2024-04-10 LAB
ANION GAP SERPL CALC-SCNC: 14 MMOL/L — SIGNIFICANT CHANGE UP (ref 5–17)
BUN SERPL-MCNC: 16.7 MG/DL — SIGNIFICANT CHANGE UP (ref 8–20)
CALCIUM SERPL-MCNC: 7.9 MG/DL — LOW (ref 8.4–10.5)
CHLORIDE SERPL-SCNC: 103 MMOL/L — SIGNIFICANT CHANGE UP (ref 96–108)
CO2 SERPL-SCNC: 20 MMOL/L — LOW (ref 22–29)
CREAT SERPL-MCNC: 0.64 MG/DL — SIGNIFICANT CHANGE UP (ref 0.5–1.3)
EGFR: 95 ML/MIN/1.73M2 — SIGNIFICANT CHANGE UP
GLUCOSE SERPL-MCNC: 112 MG/DL — HIGH (ref 70–99)
HCT VFR BLD CALC: 29.2 % — LOW (ref 34.5–45)
HGB BLD-MCNC: 9.4 G/DL — LOW (ref 11.5–15.5)
MCHC RBC-ENTMCNC: 28.5 PG — SIGNIFICANT CHANGE UP (ref 27–34)
MCHC RBC-ENTMCNC: 32.2 GM/DL — SIGNIFICANT CHANGE UP (ref 32–36)
MCV RBC AUTO: 88.5 FL — SIGNIFICANT CHANGE UP (ref 80–100)
PLATELET # BLD AUTO: 198 K/UL — SIGNIFICANT CHANGE UP (ref 150–400)
POTASSIUM SERPL-MCNC: 4.3 MMOL/L — SIGNIFICANT CHANGE UP (ref 3.5–5.3)
POTASSIUM SERPL-SCNC: 4.3 MMOL/L — SIGNIFICANT CHANGE UP (ref 3.5–5.3)
RBC # BLD: 3.3 M/UL — LOW (ref 3.8–5.2)
RBC # FLD: 13.9 % — SIGNIFICANT CHANGE UP (ref 10.3–14.5)
SODIUM SERPL-SCNC: 137 MMOL/L — SIGNIFICANT CHANGE UP (ref 135–145)
WBC # BLD: 11.15 K/UL — HIGH (ref 3.8–10.5)
WBC # FLD AUTO: 11.15 K/UL — HIGH (ref 3.8–10.5)

## 2024-04-10 PROCEDURE — 85730 THROMBOPLASTIN TIME PARTIAL: CPT

## 2024-04-10 PROCEDURE — C1713: CPT

## 2024-04-10 PROCEDURE — 85027 COMPLETE CBC AUTOMATED: CPT

## 2024-04-10 PROCEDURE — C1776: CPT

## 2024-04-10 PROCEDURE — 36415 COLL VENOUS BLD VENIPUNCTURE: CPT

## 2024-04-10 PROCEDURE — 94640 AIRWAY INHALATION TREATMENT: CPT

## 2024-04-10 PROCEDURE — 85610 PROTHROMBIN TIME: CPT

## 2024-04-10 PROCEDURE — 80048 BASIC METABOLIC PNL TOTAL CA: CPT

## 2024-04-10 PROCEDURE — 27447 TOTAL KNEE ARTHROPLASTY: CPT

## 2024-04-10 PROCEDURE — 73560 X-RAY EXAM OF KNEE 1 OR 2: CPT

## 2024-04-10 PROCEDURE — 99232 SBSQ HOSP IP/OBS MODERATE 35: CPT

## 2024-04-10 PROCEDURE — C1889: CPT

## 2024-04-10 RX ORDER — OXYCODONE HYDROCHLORIDE 5 MG/1
1 TABLET ORAL
Qty: 28 | Refills: 0
Start: 2024-04-10 | End: 2024-04-16

## 2024-04-10 RX ORDER — ONDANSETRON 4 MG/1
4 TABLET ORAL EVERY 6 HOURS
Qty: 56 | Refills: 0 | Status: COMPLETED | COMMUNITY
Start: 2024-04-10 | End: 2024-04-24

## 2024-04-10 RX ORDER — CELECOXIB 200 MG/1
1 CAPSULE ORAL
Qty: 42 | Refills: 0
Start: 2024-04-10 | End: 2024-04-30

## 2024-04-10 RX ORDER — SENNA PLUS 8.6 MG/1
2 TABLET ORAL
Qty: 14 | Refills: 0
Start: 2024-04-10 | End: 2024-04-16

## 2024-04-10 RX ORDER — ACETAMINOPHEN 500 MG
3 TABLET ORAL
Qty: 63 | Refills: 0
Start: 2024-04-10 | End: 2024-04-16

## 2024-04-10 RX ORDER — OMEPRAZOLE 10 MG/1
1 CAPSULE, DELAYED RELEASE ORAL
Qty: 42 | Refills: 0
Start: 2024-04-10 | End: 2024-05-21

## 2024-04-10 RX ORDER — ASPIRIN/CALCIUM CARB/MAGNESIUM 324 MG
1 TABLET ORAL
Qty: 84 | Refills: 0
Start: 2024-04-10 | End: 2024-05-21

## 2024-04-10 RX ORDER — ACETAMINOPHEN 500 MG
2 TABLET ORAL
Refills: 0 | DISCHARGE

## 2024-04-10 RX ADMIN — Medication 15 MILLIGRAM(S): at 06:18

## 2024-04-10 RX ADMIN — OXYCODONE HYDROCHLORIDE 10 MILLIGRAM(S): 5 TABLET ORAL at 08:53

## 2024-04-10 RX ADMIN — BUDESONIDE AND FORMOTEROL FUMARATE DIHYDRATE 2 PUFF(S): 160; 4.5 AEROSOL RESPIRATORY (INHALATION) at 08:50

## 2024-04-10 RX ADMIN — Medication 400 MILLIGRAM(S): at 06:19

## 2024-04-10 RX ADMIN — OXYCODONE HYDROCHLORIDE 10 MILLIGRAM(S): 5 TABLET ORAL at 00:36

## 2024-04-10 RX ADMIN — OXYCODONE HYDROCHLORIDE 10 MILLIGRAM(S): 5 TABLET ORAL at 09:50

## 2024-04-10 RX ADMIN — Medication 81 MILLIGRAM(S): at 06:18

## 2024-04-10 RX ADMIN — PANTOPRAZOLE SODIUM 40 MILLIGRAM(S): 20 TABLET, DELAYED RELEASE ORAL at 06:18

## 2024-04-10 NOTE — PROGRESS NOTE ADULT - SUBJECTIVE AND OBJECTIVE BOX
VAL DANIELS  [id number]    History: 68y Female is status post left total knee arthroplasty on 4/9/24, POD # 1. Patient is doing well and is comfortable. The patient's pain is controlled using the prescribed pain medications. The patient is participating in physical therapy. Denies nausea, vomiting, chest pain, shortness of breath, abdominal pain or fever. No new complaints.              MEDICATIONS  (STANDING):  acetaminophen     Tablet .. 975 milliGRAM(s) Oral every 8 hours  aspirin enteric coated 81 milliGRAM(s) Oral two times a day  budesonide 160 MICROgram(s)/formoterol 4.5 MICROgram(s) Inhaler 2 Puff(s) Inhalation two times a day  ferrous    sulfate 325 milliGRAM(s) Oral daily  fluticasone propionate 50 MICROgram(s)/spray Nasal Spray 2 Spray(s) Both Nostrils two times a day  montelukast 10 milliGRAM(s) Oral daily  multivitamin 1 Tablet(s) Oral daily  pantoprazole    Tablet 40 milliGRAM(s) Oral before breakfast  polyethylene glycol 3350 17 Gram(s) Oral at bedtime  senna 2 Tablet(s) Oral at bedtime  sodium chloride 0.9%. 1000 milliLiter(s) (100 mL/Hr) IV Continuous <Continuous>    MEDICATIONS  (PRN):  albuterol    90 MICROgram(s) HFA Inhaler 2 Puff(s) Inhalation every 6 hours PRN for bronchospasm  HYDROmorphone   Tablet 4 milliGRAM(s) Oral every 4 hours PRN Severe Pain (7 - 10)  magnesium hydroxide Suspension 30 milliLiter(s) Oral daily PRN Constipation  ondansetron Injectable 4 milliGRAM(s) IV Push every 6 hours PRN Nausea and/or Vomiting  oxyCODONE    IR 5 milliGRAM(s) Oral every 3 hours PRN Mild Pain (1 - 3)  oxyCODONE    IR 10 milliGRAM(s) Oral every 3 hours PRN Moderate Pain (4 - 6)      Physical exam: The left knee dressing is clean, dry and intact. No drainage or discharge. No erythema is noted. No blistering. No ecchymosis. The calf is supple nontender. Passive range of motion is acceptable to due postoperative pain. No calf tenderness. Sensation to light touch is grossly intact distally. Motor function distally is 5/5. Extensor hallucis longus and flexor hallucis longus are intact. No foot drop. 2+ dorsalis pedis pulse. Capillary refill is less than 2 seconds. No cyanosis.    Primary Orthopedic Assessment:  • s/p LEFT total knee replacement    Secondary  Orthopedic Assessment(s):   •     Secondary  Medical Assessment(s):   •     Plan:   • DVT prophylaxis as prescribed of ASA, including use of compression devices and ankle pumps  • Continue physical therapy  • Weightbearing as tolerated of the left lower extremity with assistance of a walker  • Incentive spirometry encouraged  • Pain control as clinically indicated  • Discharge planning – anticipated discharge is Home   
VAL DANIELS  [id number]    History: 68y Female is status post left total knee arthroplasty on POD # 0. Patient is doing well and is comfortable. The patient's pain is controlled using the prescribed pain medications. The patient is participating in physical therapy. Denies CP, SOB, dizziness, HA, fever/chills, numbness or tingling. No new complaints.    Vital Signs Last 24 Hrs  T(C): 36.7 (09 Apr 2024 11:30), Max: 37.1 (09 Apr 2024 10:22)  T(F): 98 (09 Apr 2024 11:30), Max: 98.7 (09 Apr 2024 10:22)  HR: 71 (09 Apr 2024 12:15) (66 - 80)  BP: 129/70 (09 Apr 2024 12:15) (109/66 - 143/82)  BP(mean): --  RR: 18 (09 Apr 2024 12:15) (14 - 20)  SpO2: 96% (09 Apr 2024 12:15) (96% - 100%)    Parameters below as of 09 Apr 2024 12:15  Patient On (Oxygen Delivery Method): room air        General: Alert, awake, NAD  Physical exam: The left knee dressing is clean, dry and intact. No drainage, discharge, erythema, blistering or ecchymosis noted.   The calf is supple nontender b/l.   SILT. +AT/GC/EHL/FHL.   2+ DP pulse. BCR. No cyanosis.    Plan:   - DVT prophylaxis as prescribed, including use of compression devices and ankle pumps  - Continue physical therapy  - Weight bearing status of surgical extremity: WBAT  - Incentive spirometry encouraged  - Pain control as clinically indicated  - Discharge planning – anticipated discharge is Home / subacute rehabilitation / acute rehabilitation  
CC: Left total knee arthroplasty   (09 Apr 2024 10:09)    HPI:  67 y/o female with PMH of HTN, HLD, asthma (never hospitalized or intubated), OA, GERD presents with complaints left knee pain, worsened since 2008. Pain at rest was a 0/10, but with walking could get up to a 8/10. Her pain was achy and relieved by rest. Her activity was very limited. Patient used Tylenol for pain with some relief.  Patient now s/p Left TKA POD #1.     INTERVAL HPI/OVERNIGHT EVENTS: Patient seen and examined sitting up in bed.  Patient controlled.  Patient denies any headache, dizziness, SOB, CP, abdominal pain, nausea, vomiting, dysuria.  Other ROS reviewed and are negative.    Vital Signs Last 24 Hrs  T(C): 36.3 (10 Apr 2024 04:54), Max: 37.1 (09 Apr 2024 10:22)  T(F): 97.4 (10 Apr 2024 04:54), Max: 98.7 (09 Apr 2024 10:22)  HR: 57 (10 Apr 2024 04:54) (57 - 79)  BP: 103/65 (10 Apr 2024 04:54) (97/62 - 133/55)  BP(mean): --  RR: 18 (10 Apr 2024 04:54) (14 - 20)  SpO2: 97% (10 Apr 2024 04:54) (94% - 100%)    Parameters below as of 10 Apr 2024 04:54  Patient On (Oxygen Delivery Method): room air      I&O's Detail    09 Apr 2024 07:01  -  10 Apr 2024 07:00  --------------------------------------------------------  IN:    Lactated Ringers: 75 mL    Oral Fluid: 480 mL    sodium chloride 0.9%: 1200 mL    Sodium Chloride 0.9% Bolus: 500 mL  Total IN: 2255 mL    OUT:    Voided (mL): 650 mL  Total OUT: 650 mL    Total NET: 1605 mL      PHYSICAL EXAM:  GENERAL: NAD  HEAD:  Atraumatic, Normocephalic  EYES: conjunctiva and sclera clear  NECK: Supple, No JVD, Normal thyroid  NERVOUS SYSTEM:  Alert & Oriented X3, Good concentration; Motor Strength 5/5 B/L upper and lower extremities  CHEST/LUNG: Clear to auscultation bilaterally  HEART: Regular rate and rhythm; No murmurs, rubs, or gallops  ABDOMEN: Soft, Nontender, Nondistended; Bowel sounds present  EXTREMITIES:  2+ Peripheral Pulses, Left knee with clean dressing  SKIN: No rashes or lesions                                9.4    11.15 )-----------( 198      ( 10 Apr 2024 07:42 )             29.2     10 Apr 2024 07:42    137    |  103    |  16.7   ----------------------------<  112    4.3     |  20.0   |  0.64     Ca    7.9        10 Apr 2024 07:42      PT/INR - ( 09 Apr 2024 06:10 )   PT: 11.7 sec;   INR: 1.06 ratio         PTT - ( 09 Apr 2024 06:10 )  PTT:35.1 sec    Urinalysis Basic - ( 10 Apr 2024 07:42 )    Color: x / Appearance: x / SG: x / pH: x  Gluc: 112 mg/dL / Ketone: x  / Bili: x / Urobili: x   Blood: x / Protein: x / Nitrite: x   Leuk Esterase: x / RBC: x / WBC x   Sq Epi: x / Non Sq Epi: x / Bacteria: x        MEDICATIONS  (STANDING):  acetaminophen     Tablet .. 975 milliGRAM(s) Oral every 8 hours  aspirin enteric coated 81 milliGRAM(s) Oral two times a day  budesonide 160 MICROgram(s)/formoterol 4.5 MICROgram(s) Inhaler 2 Puff(s) Inhalation two times a day  ferrous    sulfate 325 milliGRAM(s) Oral daily  fluticasone propionate 50 MICROgram(s)/spray Nasal Spray 2 Spray(s) Both Nostrils two times a day  montelukast 10 milliGRAM(s) Oral daily  multivitamin 1 Tablet(s) Oral daily  pantoprazole    Tablet 40 milliGRAM(s) Oral before breakfast  polyethylene glycol 3350 17 Gram(s) Oral at bedtime  senna 2 Tablet(s) Oral at bedtime  sodium chloride 0.9%. 1000 milliLiter(s) (100 mL/Hr) IV Continuous <Continuous>    MEDICATIONS  (PRN):  albuterol    90 MICROgram(s) HFA Inhaler 2 Puff(s) Inhalation every 6 hours PRN for bronchospasm  HYDROmorphone   Tablet 4 milliGRAM(s) Oral every 4 hours PRN Severe Pain (7 - 10)  magnesium hydroxide Suspension 30 milliLiter(s) Oral daily PRN Constipation  ondansetron Injectable 4 milliGRAM(s) IV Push every 6 hours PRN Nausea and/or Vomiting  oxyCODONE    IR 5 milliGRAM(s) Oral every 3 hours PRN Mild Pain (1 - 3)  oxyCODONE    IR 10 milliGRAM(s) Oral every 3 hours PRN Moderate Pain (4 - 6)      RADIOLOGY & ADDITIONAL TESTS:  < from: Xray Knee 1 or 2 Views, Left (04.09.24 @ 10:49) >  ACC: [id number] EXAM:  XR KNEE 1-2 VIEWS LT   ORDERED BY: SHANEKA JOINER     PROCEDURE DATE:  04/09/2024          INTERPRETATION:  Left knee. Postoperative AP and lateral views show a   knee replacement with good alignment. No bone destruction or fracture.    Findings are symmetric right knee replacement on October 17, 2023.    IMPRESSION: Left knee replacement.    --- End of Report ---            MIKAEL FELIX MD; Attending Radiologist  This document has been electronically signed. Apr 9 2024 12:19PM    < end of copied text >

## 2024-04-10 NOTE — PROGRESS NOTE ADULT - ASSESSMENT
69 y/o female with PMH of HTN, HLD, asthma (never hospitalized or intubated), OA, GERD presents with complaints left knee pain, worsened since 2008. Pain at rest was a 0/10, but with walking could get up to a 8/10. Her pain was achy and relieved by rest. Her activity was very limited. Patient used Tylenol for pain with some relief.  Patient now s/p Left TKA POD #1.    Left knee OA  S/p Left TKA POD #1  Pain control.  PT/OT.  Antibiotics, wound care and DVT prophylaxis as per Ortho.  Incentive spirometry.  Avoid opioid induced constipation.    HTN  Resume Losartan on POD#2.    HLD  Continue Zetia.    Asthma  Continue Symbicort and Singulair.    Albuterol prn.    GERD  Continue PPI.    Prediabetes  A1c 5.9.  Lifestyle modifications recommended.    Leukocytosis  Likely reactive  Afebrile, asymptomatic.    Acute blood loss anemia  Asymptomatic.  Expected acute blood loss post op.    DVT prophylaxis   ASA BID as per Ortho.    Patient medically stable for discharge to home when cleared by Ortho/PT.

## 2024-04-11 ENCOUNTER — TRANSCRIPTION ENCOUNTER (OUTPATIENT)
Age: 69
End: 2024-04-11

## 2024-04-15 ENCOUNTER — TRANSCRIPTION ENCOUNTER (OUTPATIENT)
Age: 69
End: 2024-04-15

## 2024-04-17 ENCOUNTER — TRANSCRIPTION ENCOUNTER (OUTPATIENT)
Age: 69
End: 2024-04-17

## 2024-04-17 RX ORDER — OXYCODONE 5 MG/1
5 TABLET ORAL
Qty: 30 | Refills: 0 | Status: ACTIVE | COMMUNITY
Start: 2024-04-17 | End: 1900-01-01

## 2024-04-18 ENCOUNTER — TRANSCRIPTION ENCOUNTER (OUTPATIENT)
Age: 69
End: 2024-04-18

## 2024-04-19 ENCOUNTER — TRANSCRIPTION ENCOUNTER (OUTPATIENT)
Age: 69
End: 2024-04-19

## 2024-04-19 ENCOUNTER — NON-APPOINTMENT (OUTPATIENT)
Age: 69
End: 2024-04-19

## 2024-04-29 ENCOUNTER — TRANSCRIPTION ENCOUNTER (OUTPATIENT)
Age: 69
End: 2024-04-29

## 2024-04-30 ENCOUNTER — APPOINTMENT (OUTPATIENT)
Dept: ORTHOPEDIC SURGERY | Facility: CLINIC | Age: 69
End: 2024-04-30
Payer: COMMERCIAL

## 2024-04-30 VITALS
HEART RATE: 87 BPM | BODY MASS INDEX: 32.39 KG/M2 | DIASTOLIC BLOOD PRESSURE: 69 MMHG | SYSTOLIC BLOOD PRESSURE: 124 MMHG | WEIGHT: 165 LBS | HEIGHT: 60 IN

## 2024-04-30 DIAGNOSIS — Z96.651 PRESENCE OF RIGHT ARTIFICIAL KNEE JOINT: ICD-10-CM

## 2024-04-30 DIAGNOSIS — Z96.651 AFTERCARE FOLLOWING JOINT REPLACEMENT SURGERY: ICD-10-CM

## 2024-04-30 DIAGNOSIS — Z47.1 AFTERCARE FOLLOWING JOINT REPLACEMENT SURGERY: ICD-10-CM

## 2024-04-30 PROCEDURE — 99024 POSTOP FOLLOW-UP VISIT: CPT

## 2024-04-30 PROCEDURE — 73562 X-RAY EXAM OF KNEE 3: CPT | Mod: LT

## 2024-04-30 RX ORDER — TRAMADOL HYDROCHLORIDE 50 MG/1
50 TABLET, COATED ORAL EVERY 6 HOURS
Qty: 28 | Refills: 0 | Status: ACTIVE | COMMUNITY
Start: 2023-11-07 | End: 1900-01-01

## 2024-04-30 RX ORDER — CELECOXIB 200 MG/1
200 CAPSULE ORAL
Qty: 60 | Refills: 0 | Status: ACTIVE | COMMUNITY
Start: 2023-11-07 | End: 1900-01-01

## 2024-04-30 NOTE — HISTORY OF PRESENT ILLNESS
[Xray (Date:___)] : [unfilled] Xray -  [Doing Well] : is doing well [No Sign of Infection] : is showing no signs of infection [Adequate Pain Control] : has adequate pain control [de-identified] : s/p left total knee arthroplasty. Computer-assisted tibial resection, [facility] procedure. 10/17/2023. [de-identified] : Patient is walking with a cane she is ready to start outpatient physical therapy she would like to take tramadol for pain and discontinue oxycodone request her prescription [de-identified] : Left knee exam shows healing incision with no sign of infection, ROM 0-100 degree. The surgical incision site(s) swollen, but clean, dry and intact, healed, not erythematous and not dehisced. Additional findings included an unremarkable neurological exam, peripheral vascular exam normal and maneuvers demonstrated a negative Wellington's sign.   [de-identified] :  3V xray of the left knee done in the office today and reviewed and demonstrates s/p implants in good positioning with no evidence of wear, loosening, or subsidence.   [de-identified] : Patient will start out pt physical therapy and low impact exercise. continue elevated, ice, and pain management.   Continue DVTP medication.    follow-up in  6 weeks

## 2024-05-02 NOTE — CONSULT NOTE ADULT - PROVIDER SPECIALTY LIST ADULT
NO HEAVY LIFTING FOR 24-48 HOURS AND REST    UTILIZE ICE AND HEAT FOR COMFORT     NAPROXEN PRESCRIPTION    ROBAXIN PRESCRIPTION     SEE LUMBAR STRAIN SHEET     FOLLOW-UP WITH PCP AND RETURN FOR ANY CONCERNS OR PROBLEMS  
Hospitalist

## 2024-05-08 ENCOUNTER — TRANSCRIPTION ENCOUNTER (OUTPATIENT)
Age: 69
End: 2024-05-08

## 2024-05-14 ENCOUNTER — NON-APPOINTMENT (OUTPATIENT)
Age: 69
End: 2024-05-14

## 2024-05-30 ENCOUNTER — TRANSCRIPTION ENCOUNTER (OUTPATIENT)
Age: 69
End: 2024-05-30

## 2024-06-05 ENCOUNTER — APPOINTMENT (OUTPATIENT)
Dept: ORTHOPEDIC SURGERY | Facility: CLINIC | Age: 69
End: 2024-06-05
Payer: COMMERCIAL

## 2024-06-05 VITALS
DIASTOLIC BLOOD PRESSURE: 76 MMHG | SYSTOLIC BLOOD PRESSURE: 134 MMHG | BODY MASS INDEX: 32.39 KG/M2 | HEART RATE: 76 BPM | WEIGHT: 165 LBS | HEIGHT: 60 IN

## 2024-06-05 DIAGNOSIS — Z96.652 PRESENCE OF LEFT ARTIFICIAL KNEE JOINT: ICD-10-CM

## 2024-06-05 DIAGNOSIS — Z47.1 AFTERCARE FOLLOWING JOINT REPLACEMENT SURGERY: ICD-10-CM

## 2024-06-05 DIAGNOSIS — Z96.652 AFTERCARE FOLLOWING JOINT REPLACEMENT SURGERY: ICD-10-CM

## 2024-06-05 PROCEDURE — 99024 POSTOP FOLLOW-UP VISIT: CPT

## 2024-06-05 PROCEDURE — 73562 X-RAY EXAM OF KNEE 3: CPT | Mod: LT

## 2024-06-05 NOTE — END OF VISIT
[FreeTextEntry3] : I, Kristine Caicedo, acted solely as a scribe for Dr. Kevin Mo on 06/05/2024.  I personally performed the services described in the documentation, reviewed the documentation recorded by the scribe in my presence, and it accurately and completely records my words and actions.

## 2024-06-05 NOTE — HISTORY OF PRESENT ILLNESS
[0] : no pain reported [Doing Well] : is doing well [No Sign of Infection] : is showing no signs of infection [Adequate Pain Control] : has adequate pain control [Chills] : no chills [Constipation] : no constipation [Diarrhea] : no diarrhea [Dysuria] : no dysuria [Fever] : no fever [Nausea] : no nausea [Vomiting] : no vomiting [Clean/Dry/Intact] : clean, dry and intact [Healed] : not healed [Erythema] : not erythematous [Discharge] : absent of discharge [Swelling] : swollen [Dehiscence] : not dehisced [Neuro Intact] : an unremarkable neurological exam [Vascular Intact] : ~T peripheral vascular exam normal [Negative Wellington's] : maneuvers demonstrated a negative Wellington's sign [Xray (Date:___)] : [unfilled] Xray -  [de-identified] : s/p Left total knee arthroplasty. [employer]vis augmented reality assisted total knee arthroplasty DOS:04/09/24 [de-identified] : 69 y/o F pt presents 2 months post op from left TKA. The patient is happy with her progress she wants to go back to work in 2 weeks.  She needs dental work clearance and antibiotic. She is walking well without use of cane.  Denies limitation with activities of daily living. The pt is overall happy with the surgical outcome.  [de-identified] :  left knee  exam shows healing incision with no sign of infection, ROM 0-115 . The surgical incision site(s) swollen, but clean, dry and intact, healed, not erythematous and not dehisced. Additional findings included an unremarkable neurological exam, peripheral vascular exam normal and maneuvers demonstrated a negative Wellington's sign.   [de-identified] :  3V xray of the left knee done in the office today and reviewed and demonstrates s/p implants in good positioning with no evidence of wear, loosening, or subsidence.   [de-identified] : Patient will continue with low impact exercise patient requires antibiotic prior to dental work for next 2 years.  Will see her back in 1 year for x-ray surveillance

## 2024-07-03 ENCOUNTER — NON-APPOINTMENT (OUTPATIENT)
Age: 69
End: 2024-07-03

## 2024-07-03 ENCOUNTER — TRANSCRIPTION ENCOUNTER (OUTPATIENT)
Age: 69
End: 2024-07-03

## 2025-02-08 ENCOUNTER — NON-APPOINTMENT (OUTPATIENT)
Age: 70
End: 2025-02-08

## 2025-03-13 ENCOUNTER — NON-APPOINTMENT (OUTPATIENT)
Age: 70
End: 2025-03-13

## 2025-04-08 ENCOUNTER — APPOINTMENT (OUTPATIENT)
Dept: ORTHOPEDIC SURGERY | Facility: CLINIC | Age: 70
End: 2025-04-08

## 2025-07-23 ENCOUNTER — APPOINTMENT (OUTPATIENT)
Dept: ORTHOPEDIC SURGERY | Facility: CLINIC | Age: 70
End: 2025-07-23
Payer: COMMERCIAL

## 2025-07-23 VITALS
DIASTOLIC BLOOD PRESSURE: 71 MMHG | WEIGHT: 165 LBS | BODY MASS INDEX: 32.39 KG/M2 | SYSTOLIC BLOOD PRESSURE: 134 MMHG | HEIGHT: 60 IN

## 2025-07-23 DIAGNOSIS — Z96.652 AFTERCARE FOLLOWING JOINT REPLACEMENT SURGERY: ICD-10-CM

## 2025-07-23 DIAGNOSIS — Z47.1 AFTERCARE FOLLOWING JOINT REPLACEMENT SURGERY: ICD-10-CM

## 2025-07-23 DIAGNOSIS — Z96.652 PRESENCE OF LEFT ARTIFICIAL KNEE JOINT: ICD-10-CM

## 2025-07-23 PROCEDURE — 73562 X-RAY EXAM OF KNEE 3: CPT | Mod: LT

## 2025-07-23 PROCEDURE — 99214 OFFICE O/P EST MOD 30 MIN: CPT

## 2025-07-24 NOTE — H&P PST ADULT - RESPIRATORY AND THORAX
Rx Refill Note  Requested Prescriptions     Pending Prescriptions Disp Refills    rOPINIRole (REQUIP) 0.5 MG tablet 90 tablet 3     Sig: Take 1 tablet by mouth Every Night.      Last office visit with prescribing clinician: 3/12/2025   Last telemedicine visit with prescribing clinician: Visit date not found   Next office visit with prescribing clinician: 9/12/2025                         Would you like a call back once the refill request has been completed: [] Yes [] No    If the office needs to give you a call back, can they leave a voicemail: [] Yes [] No    Aung Campbell MA  07/24/25, 14:51 EDT     negative

## 2025-08-19 DIAGNOSIS — J45.909 UNSPECIFIED ASTHMA, UNCOMPLICATED: ICD-10-CM

## 2025-08-19 RX ORDER — AZITHROMYCIN 500 MG/1
500 TABLET, FILM COATED ORAL DAILY
Qty: 10 | Refills: 0 | Status: COMPLETED | COMMUNITY
Start: 2025-08-19 | End: 2025-08-29

## 2025-09-16 ENCOUNTER — NON-APPOINTMENT (OUTPATIENT)
Age: 70
End: 2025-09-16

## (undated) DEVICE — SAW BLADE STRYKER SAGITTAL DUAL CUT 75X18X1.27MM

## (undated) DEVICE — DRSG MEPILEX 10 X 25CM (4 X 10") POST OP AG SILVER

## (undated) DEVICE — DRAPE U LONG 47X70"

## (undated) DEVICE — SUT VICRYL 2-0 27" CT-2 UNDYED

## (undated) DEVICE — GLV 8 PROTEXIS (BLUE)

## (undated) DEVICE — ZIMMER MIXING BOWL

## (undated) DEVICE — TAPE SILK 3"

## (undated) DEVICE — NDL HYPO SAFE 20G X 1.5" (YELLOW)

## (undated) DEVICE — ELCTR STRYKER NEPTUNE SMOKE EVACUATION PENCIL (GREEN)

## (undated) DEVICE — SUT VICRYL 0 18" CT-1 UNDYED (POP-OFF)

## (undated) DEVICE — GLV 8 PROTEXIS ORTHO (BROWN)

## (undated) DEVICE — WOUND IRR SURGIPHOR

## (undated) DEVICE — HOOD FLYTE STRYKER SURGICOOL W PEELAWAY

## (undated) DEVICE — SUT MONOCRYL 3-0 27" PS-2 UNDYED

## (undated) DEVICE — VENODYNE/SCD SLEEVE CALF MEDIUM

## (undated) DEVICE — DRAPE 3/4 SHEET 52X76"

## (undated) DEVICE — PACK TOTAL KNEE

## (undated) DEVICE — SOL IRR BAG NS 0.9% 3000ML

## (undated) DEVICE — SOL IRR POUR NS 0.9% 1000ML

## (undated) DEVICE — WARMING BLANKET UPPER ADULT

## (undated) DEVICE — DRAPE 1/2 SHEET 40X57"

## (undated) DEVICE — ZIMMER PULSAVAC PLUS FAN KIT

## (undated) DEVICE — SYR LUER LOK 50CC

## (undated) DEVICE — ZIMMER BLADE PATELLA REAMER W PILOT HOLE SZ 32

## (undated) DEVICE — SOL IRR POUR H2O 1000ML

## (undated) DEVICE — DRSG DERMABOND PRINEO 60CM

## (undated) DEVICE — ORTHOALIGN PLUS UNIT

## (undated) DEVICE — ZIMMER BLADE PATELLA REAMER SIZE 29

## (undated) DEVICE — DRAPE XL SHEET 77X98"

## (undated) DEVICE — SAW BLADE ZIMMER RECIPROCATING DOUBLE SIDED 12.5X96X1.19MM